# Patient Record
Sex: MALE | Race: WHITE | Employment: FULL TIME | URBAN - METROPOLITAN AREA
[De-identification: names, ages, dates, MRNs, and addresses within clinical notes are randomized per-mention and may not be internally consistent; named-entity substitution may affect disease eponyms.]

---

## 2018-07-13 PROBLEM — J43.8 OTHER EMPHYSEMA (HCC): Status: ACTIVE | Noted: 2018-07-13

## 2019-10-30 PROBLEM — I73.9 PVD (PERIPHERAL VASCULAR DISEASE) (HCC): Status: ACTIVE | Noted: 2019-10-30

## 2019-10-30 PROBLEM — F17.210 CIGARETTE SMOKER: Status: ACTIVE | Noted: 2019-10-30

## 2019-10-30 PROBLEM — Z91.89 MULTIPLE RISK FACTORS FOR CORONARY ARTERY DISEASE: Status: ACTIVE | Noted: 2019-10-30

## 2020-01-31 PROBLEM — N52.9 ERECTILE DYSFUNCTION: Status: ACTIVE | Noted: 2020-01-31

## 2020-01-31 PROBLEM — Z98.62 HISTORY OF ANGIOPLASTY: Status: ACTIVE | Noted: 2020-01-31

## 2021-10-31 ENCOUNTER — HOSPITAL ENCOUNTER (INPATIENT)
Age: 61
LOS: 3 days | Discharge: HOME HEALTH | DRG: 378 | End: 2021-11-03
Admitting: STUDENT IN AN ORGANIZED HEALTH CARE EDUCATION/TRAINING PROGRAM
Payer: COMMERCIAL

## 2021-10-31 ENCOUNTER — APPOINTMENT (OUTPATIENT)
Dept: CT IMAGING | Age: 61
DRG: 378 | End: 2021-10-31
Payer: COMMERCIAL

## 2021-10-31 ENCOUNTER — APPOINTMENT (OUTPATIENT)
Dept: ULTRASOUND IMAGING | Age: 61
DRG: 378 | End: 2021-10-31
Attending: STUDENT IN AN ORGANIZED HEALTH CARE EDUCATION/TRAINING PROGRAM
Payer: COMMERCIAL

## 2021-10-31 ENCOUNTER — APPOINTMENT (OUTPATIENT)
Dept: CT IMAGING | Age: 61
DRG: 378 | End: 2021-10-31
Attending: STUDENT IN AN ORGANIZED HEALTH CARE EDUCATION/TRAINING PROGRAM
Payer: COMMERCIAL

## 2021-10-31 DIAGNOSIS — I99.8 ISCHEMIC LEG PAIN: Primary | ICD-10-CM

## 2021-10-31 DIAGNOSIS — M79.606 ISCHEMIC LEG PAIN: Primary | ICD-10-CM

## 2021-10-31 DIAGNOSIS — K92.2 ACUTE GI BLEEDING: ICD-10-CM

## 2021-10-31 DIAGNOSIS — I70.209 ARTERIAL OCCLUSION, LOWER EXTREMITY (HCC): ICD-10-CM

## 2021-10-31 PROBLEM — N17.9 AKI (ACUTE KIDNEY INJURY) (HCC): Status: ACTIVE | Noted: 2021-10-31

## 2021-10-31 LAB
ABO + RH BLD: NORMAL
ALBUMIN SERPL-MCNC: 2.6 G/DL (ref 3.5–5)
ALBUMIN/GLOB SERPL: 0.8 {RATIO} (ref 1.2–3.5)
ALP SERPL-CCNC: 79 U/L (ref 50–136)
ALT SERPL-CCNC: 14 U/L (ref 12–65)
AMMONIA PLAS-SCNC: 26 UMOL/L (ref 11–32)
AMMONIA PLAS-SCNC: 43 UMOL/L (ref 11–32)
ANION GAP SERPL CALC-SCNC: 12 MMOL/L (ref 7–16)
AST SERPL-CCNC: 9 U/L (ref 15–37)
ATRIAL RATE: 115 BPM
BASOPHILS # BLD: 0.1 K/UL (ref 0–0.2)
BASOPHILS NFR BLD: 1 % (ref 0–2)
BILIRUB SERPL-MCNC: 0.4 MG/DL (ref 0.2–1.1)
BLOOD GROUP ANTIBODIES SERPL: NORMAL
BUN SERPL-MCNC: 50 MG/DL (ref 6–23)
CALCIUM SERPL-MCNC: 8.6 MG/DL (ref 8.3–10.4)
CALCULATED P AXIS, ECG09: 82 DEGREES
CALCULATED R AXIS, ECG10: 80 DEGREES
CALCULATED T AXIS, ECG11: 74 DEGREES
CHLORIDE SERPL-SCNC: 107 MMOL/L (ref 98–107)
CO2 SERPL-SCNC: 20 MMOL/L (ref 21–32)
CREAT SERPL-MCNC: 1.42 MG/DL (ref 0.8–1.5)
DIAGNOSIS, 93000: NORMAL
DIFFERENTIAL METHOD BLD: ABNORMAL
EOSINOPHIL # BLD: 0.2 K/UL (ref 0–0.8)
EOSINOPHIL NFR BLD: 1 % (ref 0.5–7.8)
ERYTHROCYTE [DISTWIDTH] IN BLOOD BY AUTOMATED COUNT: 15.1 % (ref 11.9–14.6)
EST. AVERAGE GLUCOSE BLD GHB EST-MCNC: 111 MG/DL
GLOBULIN SER CALC-MCNC: 3.3 G/DL (ref 2.3–3.5)
GLUCOSE BLD STRIP.AUTO-MCNC: 149 MG/DL (ref 65–100)
GLUCOSE SERPL-MCNC: 222 MG/DL (ref 65–100)
HBA1C MFR BLD: 5.5 % (ref 4.2–6.3)
HCT VFR BLD AUTO: 42.6 % (ref 41.1–50.3)
HGB BLD-MCNC: 11.3 G/DL (ref 13.6–17.2)
HGB BLD-MCNC: 12.1 G/DL (ref 13.6–17.2)
HGB BLD-MCNC: 13.6 G/DL (ref 13.6–17.2)
IMM GRANULOCYTES # BLD AUTO: 0.2 K/UL (ref 0–0.5)
IMM GRANULOCYTES NFR BLD AUTO: 2 % (ref 0–5)
INR PPP: 1
LYMPHOCYTES # BLD: 3.2 K/UL (ref 0.5–4.6)
LYMPHOCYTES NFR BLD: 27 % (ref 13–44)
MCH RBC QN AUTO: 30.8 PG (ref 26.1–32.9)
MCHC RBC AUTO-ENTMCNC: 31.9 G/DL (ref 31.4–35)
MCV RBC AUTO: 96.4 FL (ref 79.6–97.8)
MONOCYTES # BLD: 1.2 K/UL (ref 0.1–1.3)
MONOCYTES NFR BLD: 10 % (ref 4–12)
NEUTS SEG # BLD: 7.1 K/UL (ref 1.7–8.2)
NEUTS SEG NFR BLD: 59 % (ref 43–78)
NRBC # BLD: 0 K/UL (ref 0–0.2)
P-R INTERVAL, ECG05: 120 MS
PLATELET # BLD AUTO: 255 K/UL (ref 150–450)
PMV BLD AUTO: 10.1 FL (ref 9.4–12.3)
POTASSIUM SERPL-SCNC: 3.4 MMOL/L (ref 3.5–5.1)
PROT SERPL-MCNC: 5.9 G/DL (ref 6.3–8.2)
PROTHROMBIN TIME: 13.5 SEC (ref 12.6–14.5)
Q-T INTERVAL, ECG07: 340 MS
QRS DURATION, ECG06: 86 MS
QTC CALCULATION (BEZET), ECG08: 470 MS
RBC # BLD AUTO: 4.42 M/UL (ref 4.23–5.6)
SERVICE CMNT-IMP: ABNORMAL
SODIUM SERPL-SCNC: 139 MMOL/L (ref 138–145)
SPECIMEN EXP DATE BLD: NORMAL
VENTRICULAR RATE, ECG03: 115 BPM
WBC # BLD AUTO: 11.9 K/UL (ref 4.3–11.1)

## 2021-10-31 PROCEDURE — 94762 N-INVAS EAR/PLS OXIMTRY CONT: CPT

## 2021-10-31 PROCEDURE — 74011250636 HC RX REV CODE- 250/636: Performed by: STUDENT IN AN ORGANIZED HEALTH CARE EDUCATION/TRAINING PROGRAM

## 2021-10-31 PROCEDURE — 93005 ELECTROCARDIOGRAM TRACING: CPT

## 2021-10-31 PROCEDURE — 83036 HEMOGLOBIN GLYCOSYLATED A1C: CPT

## 2021-10-31 PROCEDURE — 74011250636 HC RX REV CODE- 250/636

## 2021-10-31 PROCEDURE — 96372 THER/PROPH/DIAG INJ SC/IM: CPT

## 2021-10-31 PROCEDURE — 85018 HEMOGLOBIN: CPT

## 2021-10-31 PROCEDURE — 80053 COMPREHEN METABOLIC PANEL: CPT

## 2021-10-31 PROCEDURE — 70450 CT HEAD/BRAIN W/O DYE: CPT

## 2021-10-31 PROCEDURE — 82140 ASSAY OF AMMONIA: CPT

## 2021-10-31 PROCEDURE — 74011000636 HC RX REV CODE- 636

## 2021-10-31 PROCEDURE — 74011000258 HC RX REV CODE- 258

## 2021-10-31 PROCEDURE — 96375 TX/PRO/DX INJ NEW DRUG ADDON: CPT

## 2021-10-31 PROCEDURE — 36415 COLL VENOUS BLD VENIPUNCTURE: CPT

## 2021-10-31 PROCEDURE — 85025 COMPLETE CBC W/AUTO DIFF WBC: CPT

## 2021-10-31 PROCEDURE — 82962 GLUCOSE BLOOD TEST: CPT

## 2021-10-31 PROCEDURE — 99285 EMERGENCY DEPT VISIT HI MDM: CPT

## 2021-10-31 PROCEDURE — 65660000000 HC RM CCU STEPDOWN

## 2021-10-31 PROCEDURE — C9113 INJ PANTOPRAZOLE SODIUM, VIA: HCPCS

## 2021-10-31 PROCEDURE — 86901 BLOOD TYPING SEROLOGIC RH(D): CPT

## 2021-10-31 PROCEDURE — 85610 PROTHROMBIN TIME: CPT

## 2021-10-31 PROCEDURE — 74011250637 HC RX REV CODE- 250/637: Performed by: STUDENT IN AN ORGANIZED HEALTH CARE EDUCATION/TRAINING PROGRAM

## 2021-10-31 PROCEDURE — 2709999900 HC NON-CHARGEABLE SUPPLY

## 2021-10-31 PROCEDURE — 96374 THER/PROPH/DIAG INJ IV PUSH: CPT

## 2021-10-31 PROCEDURE — 74011000250 HC RX REV CODE- 250

## 2021-10-31 PROCEDURE — 74011250636 HC RX REV CODE- 250/636: Performed by: HOSPITALIST

## 2021-10-31 PROCEDURE — 75635 CT ANGIO ABDOMINAL ARTERIES: CPT

## 2021-10-31 PROCEDURE — 74011000250 HC RX REV CODE- 250: Performed by: STUDENT IN AN ORGANIZED HEALTH CARE EDUCATION/TRAINING PROGRAM

## 2021-10-31 PROCEDURE — 74011250636 HC RX REV CODE- 250/636: Performed by: EMERGENCY MEDICINE

## 2021-10-31 PROCEDURE — 76705 ECHO EXAM OF ABDOMEN: CPT

## 2021-10-31 RX ORDER — ONDANSETRON 2 MG/ML
4 INJECTION INTRAMUSCULAR; INTRAVENOUS
Status: DISCONTINUED | OUTPATIENT
Start: 2021-10-31 | End: 2021-11-03 | Stop reason: HOSPADM

## 2021-10-31 RX ORDER — DEXTROSE 40 %
15 GEL (GRAM) ORAL AS NEEDED
Status: DISCONTINUED | OUTPATIENT
Start: 2021-10-31 | End: 2021-11-03 | Stop reason: HOSPADM

## 2021-10-31 RX ORDER — ONDANSETRON 2 MG/ML
4 INJECTION INTRAMUSCULAR; INTRAVENOUS
Status: DISCONTINUED | OUTPATIENT
Start: 2021-10-31 | End: 2021-10-31

## 2021-10-31 RX ORDER — LORAZEPAM 2 MG/ML
1 INJECTION INTRAMUSCULAR
Status: COMPLETED | OUTPATIENT
Start: 2021-10-31 | End: 2021-10-31

## 2021-10-31 RX ORDER — DEXTROSE 50 % IN WATER (D50W) INTRAVENOUS SYRINGE
25-50 AS NEEDED
Status: DISCONTINUED | OUTPATIENT
Start: 2021-10-31 | End: 2021-11-03 | Stop reason: HOSPADM

## 2021-10-31 RX ORDER — SODIUM CHLORIDE 0.9 % (FLUSH) 0.9 %
5-10 SYRINGE (ML) INJECTION EVERY 8 HOURS
Status: DISCONTINUED | OUTPATIENT
Start: 2021-10-31 | End: 2021-11-01

## 2021-10-31 RX ORDER — HALOPERIDOL 5 MG/ML
10 INJECTION INTRAMUSCULAR
Status: COMPLETED | OUTPATIENT
Start: 2021-10-31 | End: 2021-10-31

## 2021-10-31 RX ORDER — DIPHENHYDRAMINE HYDROCHLORIDE 50 MG/ML
25 INJECTION, SOLUTION INTRAMUSCULAR; INTRAVENOUS
Status: COMPLETED | OUTPATIENT
Start: 2021-10-31 | End: 2021-10-31

## 2021-10-31 RX ORDER — FENTANYL CITRATE 50 UG/ML
50 INJECTION, SOLUTION INTRAMUSCULAR; INTRAVENOUS ONCE
Status: COMPLETED | OUTPATIENT
Start: 2021-10-31 | End: 2021-10-31

## 2021-10-31 RX ORDER — ONDANSETRON 2 MG/ML
4 INJECTION INTRAMUSCULAR; INTRAVENOUS
Status: COMPLETED | OUTPATIENT
Start: 2021-10-31 | End: 2021-10-31

## 2021-10-31 RX ORDER — SODIUM CHLORIDE 9 MG/ML
1000 INJECTION, SOLUTION INTRAVENOUS CONTINUOUS
Status: DISCONTINUED | OUTPATIENT
Start: 2021-10-31 | End: 2021-11-03 | Stop reason: HOSPADM

## 2021-10-31 RX ORDER — LORAZEPAM 0.5 MG/1
0.5 TABLET ORAL
Status: DISCONTINUED | OUTPATIENT
Start: 2021-10-31 | End: 2021-11-03 | Stop reason: HOSPADM

## 2021-10-31 RX ORDER — LORAZEPAM 2 MG/ML
4 INJECTION INTRAMUSCULAR ONCE
Status: COMPLETED | OUTPATIENT
Start: 2021-10-31 | End: 2021-10-31

## 2021-10-31 RX ORDER — ATORVASTATIN CALCIUM 40 MG/1
40 TABLET, FILM COATED ORAL DAILY
Status: DISCONTINUED | OUTPATIENT
Start: 2021-10-31 | End: 2021-11-03 | Stop reason: HOSPADM

## 2021-10-31 RX ORDER — HYDROMORPHONE HYDROCHLORIDE 1 MG/ML
1 INJECTION, SOLUTION INTRAMUSCULAR; INTRAVENOUS; SUBCUTANEOUS ONCE
Status: DISCONTINUED | OUTPATIENT
Start: 2021-10-31 | End: 2021-10-31

## 2021-10-31 RX ORDER — SODIUM CHLORIDE 0.9 % (FLUSH) 0.9 %
10 SYRINGE (ML) INJECTION
Status: ACTIVE | OUTPATIENT
Start: 2021-10-31 | End: 2021-10-31

## 2021-10-31 RX ORDER — FOLIC ACID 1 MG/1
1 TABLET ORAL DAILY
Status: DISCONTINUED | OUTPATIENT
Start: 2021-11-03 | End: 2021-11-03 | Stop reason: HOSPADM

## 2021-10-31 RX ORDER — LORAZEPAM 0.5 MG/1
2 TABLET ORAL
Status: DISCONTINUED | OUTPATIENT
Start: 2021-10-31 | End: 2021-11-03 | Stop reason: HOSPADM

## 2021-10-31 RX ORDER — LORAZEPAM 0.5 MG/1
1 TABLET ORAL
Status: DISCONTINUED | OUTPATIENT
Start: 2021-10-31 | End: 2021-11-03 | Stop reason: HOSPADM

## 2021-10-31 RX ORDER — INSULIN LISPRO 100 [IU]/ML
INJECTION, SOLUTION INTRAVENOUS; SUBCUTANEOUS
Status: DISCONTINUED | OUTPATIENT
Start: 2021-10-31 | End: 2021-11-03 | Stop reason: HOSPADM

## 2021-10-31 RX ORDER — SODIUM CHLORIDE 0.9 % (FLUSH) 0.9 %
5-40 SYRINGE (ML) INJECTION AS NEEDED
Status: DISCONTINUED | OUTPATIENT
Start: 2021-10-31 | End: 2021-11-03 | Stop reason: HOSPADM

## 2021-10-31 RX ORDER — SODIUM CHLORIDE 0.9 % (FLUSH) 0.9 %
5-40 SYRINGE (ML) INJECTION EVERY 8 HOURS
Status: DISCONTINUED | OUTPATIENT
Start: 2021-10-31 | End: 2021-11-03 | Stop reason: HOSPADM

## 2021-10-31 RX ORDER — SODIUM CHLORIDE 0.9 % (FLUSH) 0.9 %
5-10 SYRINGE (ML) INJECTION AS NEEDED
Status: DISCONTINUED | OUTPATIENT
Start: 2021-10-31 | End: 2021-11-01

## 2021-10-31 RX ORDER — HALOPERIDOL 5 MG/ML
4 INJECTION INTRAMUSCULAR ONCE
Status: COMPLETED | OUTPATIENT
Start: 2021-10-31 | End: 2021-10-31

## 2021-10-31 RX ORDER — FENTANYL CITRATE 50 UG/ML
INJECTION, SOLUTION INTRAMUSCULAR; INTRAVENOUS
Status: COMPLETED
Start: 2021-10-31 | End: 2021-10-31

## 2021-10-31 RX ORDER — LORAZEPAM 2 MG/ML
2 INJECTION INTRAMUSCULAR
Status: DISCONTINUED | OUTPATIENT
Start: 2021-10-31 | End: 2021-11-03 | Stop reason: HOSPADM

## 2021-10-31 RX ORDER — ATENOLOL 25 MG/1
25 TABLET ORAL DAILY PRN
Status: DISCONTINUED | OUTPATIENT
Start: 2021-10-31 | End: 2021-11-03 | Stop reason: HOSPADM

## 2021-10-31 RX ORDER — LANOLIN ALCOHOL/MO/W.PET/CERES
100 CREAM (GRAM) TOPICAL DAILY
Status: DISCONTINUED | OUTPATIENT
Start: 2021-11-03 | End: 2021-11-03 | Stop reason: HOSPADM

## 2021-10-31 RX ORDER — HYDROMORPHONE HYDROCHLORIDE 1 MG/ML
1 INJECTION, SOLUTION INTRAMUSCULAR; INTRAVENOUS; SUBCUTANEOUS ONCE
Status: COMPLETED | OUTPATIENT
Start: 2021-10-31 | End: 2021-10-31

## 2021-10-31 RX ORDER — MORPHINE SULFATE 2 MG/ML
2 INJECTION, SOLUTION INTRAMUSCULAR; INTRAVENOUS
Status: DISCONTINUED | OUTPATIENT
Start: 2021-10-31 | End: 2021-11-03 | Stop reason: HOSPADM

## 2021-10-31 RX ADMIN — Medication 5 ML: at 20:20

## 2021-10-31 RX ADMIN — HYDROMORPHONE HYDROCHLORIDE 1 MG: 1 INJECTION, SOLUTION INTRAMUSCULAR; INTRAVENOUS; SUBCUTANEOUS at 07:34

## 2021-10-31 RX ADMIN — FOLIC ACID: 5 INJECTION, SOLUTION INTRAMUSCULAR; INTRAVENOUS; SUBCUTANEOUS at 17:18

## 2021-10-31 RX ADMIN — ATORVASTATIN CALCIUM 40 MG: 40 TABLET, FILM COATED ORAL at 12:56

## 2021-10-31 RX ADMIN — LORAZEPAM 0.5 MG: 0.5 TABLET ORAL at 13:38

## 2021-10-31 RX ADMIN — SODIUM CHLORIDE 1000 ML: 900 INJECTION, SOLUTION INTRAVENOUS at 06:43

## 2021-10-31 RX ADMIN — FENTANYL CITRATE 50 MCG: 50 INJECTION INTRAMUSCULAR; INTRAVENOUS at 06:38

## 2021-10-31 RX ADMIN — LORAZEPAM 1 MG: 2 INJECTION INTRAMUSCULAR; INTRAVENOUS at 07:05

## 2021-10-31 RX ADMIN — LORAZEPAM 4 MG: 2 INJECTION INTRAMUSCULAR; INTRAVENOUS at 22:40

## 2021-10-31 RX ADMIN — Medication 5 ML: at 13:08

## 2021-10-31 RX ADMIN — ONDANSETRON 4 MG: 2 INJECTION INTRAMUSCULAR; INTRAVENOUS at 07:35

## 2021-10-31 RX ADMIN — Medication 5 ML: at 20:21

## 2021-10-31 RX ADMIN — ONDANSETRON 4 MG: 2 INJECTION INTRAMUSCULAR; INTRAVENOUS at 18:14

## 2021-10-31 RX ADMIN — LORAZEPAM 1 MG: 0.5 TABLET ORAL at 17:36

## 2021-10-31 RX ADMIN — LORAZEPAM 2 MG: 2 INJECTION INTRAMUSCULAR; INTRAVENOUS at 20:14

## 2021-10-31 RX ADMIN — SODIUM CHLORIDE 40 MG: 9 INJECTION INTRAMUSCULAR; INTRAVENOUS; SUBCUTANEOUS at 06:51

## 2021-10-31 RX ADMIN — MORPHINE SULFATE 2 MG: 2 INJECTION, SOLUTION INTRAMUSCULAR; INTRAVENOUS at 18:13

## 2021-10-31 RX ADMIN — DIPHENHYDRAMINE HYDROCHLORIDE 25 MG: 50 INJECTION, SOLUTION INTRAMUSCULAR; INTRAVENOUS at 06:50

## 2021-10-31 RX ADMIN — HALOPERIDOL LACTATE 4 MG: 5 INJECTION, SOLUTION INTRAMUSCULAR at 20:59

## 2021-10-31 RX ADMIN — FENTANYL CITRATE 50 MCG: 50 INJECTION INTRAMUSCULAR; INTRAVENOUS at 06:37

## 2021-10-31 RX ADMIN — HALOPERIDOL LACTATE 10 MG: 5 INJECTION, SOLUTION INTRAMUSCULAR at 06:50

## 2021-10-31 RX ADMIN — FENTANYL CITRATE 50 MCG: 50 INJECTION, SOLUTION INTRAMUSCULAR; INTRAVENOUS at 06:37

## 2021-10-31 RX ADMIN — SODIUM CHLORIDE 1000 ML: 900 INJECTION, SOLUTION INTRAVENOUS at 12:55

## 2021-10-31 RX ADMIN — SODIUM CHLORIDE 100 ML: 900 INJECTION, SOLUTION INTRAVENOUS at 07:38

## 2021-10-31 RX ADMIN — IOPAMIDOL 100 ML: 755 INJECTION, SOLUTION INTRAVENOUS at 07:32

## 2021-10-31 RX ADMIN — SODIUM CHLORIDE 40 MG: 9 INJECTION INTRAMUSCULAR; INTRAVENOUS; SUBCUTANEOUS at 20:18

## 2021-10-31 NOTE — CONSULTS
History and Physical/Surgical Consult   Leo Keller    Admit date: 10/31/2021    MRN: [id number]     : 1960     Age: 62 y.o.          10/31/2021 10:08 AM    Subjective/HPI:     27-year-old male brought in by EMS. They were called to the scene who is home for bloody emesis and right leg pain. Patient said alcohol of the drinks daily. Has not been taking his medications. His right foot is cold and pulseless. Patient is having terrible pain in the right foot as well. Patient describes black tarry stool for several days. Patient had a blood clot in his right leg in  did not require a stent. Review of Systems  Pertinent items are noted in HPI. Past Medical History:   Diagnosis Date    CAD (coronary artery disease)     Hypertension     Hypertensive cardiomegaly 10/4/2016    Hypertensive heart disease, unspecified without heart failure 10/4/2016    Incomplete bladder emptying 10/4/2016    Thromboembolus ([address]       Past Surgical History:   Procedure Laterality Date    HX OTHER SURGICAL      DVT    CT LEFT HEART CATH,PERCUTANEOUS  2012    Xience to RCA      No Known Allergies   Social History     Tobacco Use    Smoking status: Current Every Day Smoker     Packs/day: 1.00     Years: 20.00     Pack years: 20.00    Smokeless tobacco: Never Used   Substance Use Topics    Alcohol use: Yes     Alcohol/week: 5.8 standard drinks     Types: 7 Cans of beer per week      Social History     Social History Narrative    Not on file     Family History   Problem Relation Age of Onset    Heart Disease Father     Cancer Father     Heart Disease Mother     Cancer Sister     Diabetes Brother     Diabetes Maternal Grandmother     Cancer Maternal Grandmother       Prior to Admission Medications   Prescriptions Last Dose Informant Patient Reported? Taking?   aspirin (ASPIRIN) 325 mg tablet   No No   Sig: Take 1 Tab by mouth daily.    atorvastatin (LIPITOR) 40 mg tablet   No No   Sig: Take 1 Tab by mouth daily. carvedilol (COREG) 12.5 mg tablet   No No   Sig: Take 1 Tab by mouth two (2) times a day. lisinopril (PRINIVIL, ZESTRIL) 20 mg tablet   No No   Sig: Take 1 Tab by mouth daily. sildenafil, antihypertensive, (REVATIO) 20 mg tablet   No No   Si daily as needed for erectile dysfunction      Facility-Administered Medications: None     Current Facility-Administered Medications   Medication Dose Route Frequency    sodium chloride (NS) flush 5-10 mL  5-10 mL IntraVENous Q8H    sodium chloride (NS) flush 5-10 mL  5-10 mL IntraVENous PRN    saline peripheral flush soln 10 mL  10 mL InterCATHeter RAD ONCE    pantoprazole (PROTONIX) 40 mg in 0.9% sodium chloride 10 mL injection  40 mg IntraVENous Q12H    0.9% sodium chloride infusion 1,000 mL  1,000 mL IntraVENous CONTINUOUS    sodium chloride (NS) flush 5-40 mL  5-40 mL IntraVENous Q8H    sodium chloride (NS) flush 5-40 mL  5-40 mL IntraVENous PRN    ondansetron (ZOFRAN) injection 4 mg  4 mg IntraVENous Q4H PRN    LORazepam (ATIVAN) tablet 0.5 mg  0.5 mg Oral Q6H PRN    atorvastatin (LIPITOR) tablet 40 mg  40 mg Oral DAILY    morphine injection 2 mg  2 mg IntraVENous Q4H PRN     Current Outpatient Medications   Medication Sig    aspirin (ASPIRIN) 325 mg tablet Take 1 Tab by mouth daily.  atorvastatin (LIPITOR) 40 mg tablet Take 1 Tab by mouth daily.  carvedilol (COREG) 12.5 mg tablet Take 1 Tab by mouth two (2) times a day.  lisinopril (PRINIVIL, ZESTRIL) 20 mg tablet Take 1 Tab by mouth daily.  sildenafil, antihypertensive, (REVATIO) 20 mg tablet 225 daily as needed for erectile dysfunction     Objective:     Vitals:    10/31/21 0737 10/31/21 0738 10/31/21 0751 10/31/21 0842   BP:  (!) 151/81 136/65    Pulse: (!) 104 (!) 105 (!) 112    Resp: 26 27 25    SpO2: 99% 93%  98%   Weight:       Height:         No intake/output data recorded. No intake/output data recorded. Physical Exam:   Vitals and nursing note reviewed. Constitutional:       General: He is in acute distress. He is intoxicated still and not making much sense     Appearance: He is well-developed. He is ill-appearing and toxic-appearing. HENT:      Head: Normocephalic and atraumatic. Right Ear: External ear normal.      Left Ear: External ear normal.      Nose: Nose normal.   Eyes:      General: No scleral icterus. Right eye: No discharge. Left eye: No discharge. Conjunctiva/sclera: Conjunctivae normal.      Pupils: Pupils are equal, round, and reactive to light. Cardiovascular:      Rate and Rhythm: Regular rhythm. Pulmonary:      Effort: Pulmonary effort is normal. No respiratory distress. Breath sounds: Normal breath sounds. No stridor. No wheezing or rales. Abdominal:      General: Bowel sounds are normal. There is no distension. Palpations: Abdomen is soft. Tenderness: There is no abdominal tenderness. Genitourinary:     Rectum: Guaiac result positive. Musculoskeletal:         General: Normal range of motion. Cervical back: Normal range of motion. Right foot: Decreased capillary refill. Abnormal pulse. Legs:Data Review   Recent Labs     10/31/21  0631   WBC 11.9*   HGB 13.6   HCT 42.6        Recent Labs     10/31/21  0631      K 3.4*      CO2 20*   *   BUN 50*   CREA 1.42   INR 1.0       Assessment:     Hospital Problems  Date Reviewed: 4/12/2019        Codes Class Noted POA    GIB (gastrointestinal bleeding) ICD-10-CM: K92.2  ICD-9-CM: 578.9  10/31/2021 Unknown        Ischemic leg pain ICD-10-CM: M79.606, I99.8  ICD-9-CM: 459.9, 729.5  10/31/2021 Unknown            Plan:     --    Thank you very much for this referral.  The family had left at this point. I have discussed the patient with the ER staff. I had reviewed the CTA which was of suboptimal quality. It is probably due to moving artifact.   He has multilevel occlusions and evidence of chronic vascular disease. Due to the amount of bleeding that is ongoing the GI this would take precedence over the cold like. He would require thrombolysis with TPA which is contraindicated here and at a minimum any intervention will require loading with heparin which would also make the bleeding more complicated. It is a difficult predicament which most likely going to end up with him losing his right leg. Approximately 45 minutes  was spent in direct patient contact during this encounter including 50% of which was spend in patient education, counseling, review of medical history and imaging, and medical decision making.

## 2021-10-31 NOTE — CONSULTS
[facility] Consult Note    Consult Date: 10/31/2021    Chief Complaint: GI bleed    Subjective:     History of Present Illness:  Patient is a 62 y.o. who is seen in consultation at the request of Dr. Timoteo Oconnor for GI bleed. Patient very agitated from R leg pain and did not focus on my questions. But from what I was able to get, he vomited red blood x 1 today, three melenic BM. Never has had GI bleed before. Our office does not have any records on him. He takes Aleve (most recently a few days ago). No CP, SOB, abd pains. He has severe constant R leg pain worsening over the past few months. According to Dr Segura Foot note, patient had R leg blood clot, no stent. PMH:  Past Medical History:   Diagnosis Date    CAD (coronary artery disease)     Hypertension     Hypertensive cardiomegaly 10/4/2016    Hypertensive heart disease, unspecified without heart failure 10/4/2016    Incomplete bladder emptying 10/4/2016    Thromboembolus ([address]    June 2012: cardiac cath single RCA stenosis stented with BASIL. Chronic PVD    PSH:  Past Surgical History:   Procedure Laterality Date    HX OTHER SURGICAL      DVT    [place] LEFT HEART CATH,PERCUTANEOUS  6/14/2012    Xience to RCA       Allergies:  No Known Allergies    Home Medications:  Prior to Admission medications    Medication Sig Start Date End Date Taking? Authorizing Provider   aspirin (ASPIRIN) 325 mg tablet Take 1 Tab by mouth daily. 10/30/19   Bethel Corona MD   atorvastatin (LIPITOR) 40 mg tablet Take 1 Tab by mouth daily. 10/30/19   Bethel Corona MD   carvedilol (COREG) 12.5 mg tablet Take 1 Tab by mouth two (2) times a day. 10/30/19   Bethel Corona MD   lisinopril (PRINIVIL, ZESTRIL) 20 mg tablet Take 1 Tab by mouth daily.  10/30/19   Farida Corona MD   sildenafil, antihypertensive, (REVATIO) 20 mg tablet 225 daily as needed for erectile dysfunction 7/13/18   Brigitte Best MD       [facility] Medications:  Current Facility-Administered Medications   Medication Dose Route Frequency    sodium chloride (NS) flush 5-10 mL  5-10 mL IntraVENous Q8H    sodium chloride (NS) flush 5-10 mL  5-10 mL IntraVENous PRN    saline peripheral flush soln 10 mL  10 mL InterCATHeter RAD ONCE    pantoprazole (PROTONIX) 40 mg in 0.9% sodium chloride 10 mL injection  40 mg IntraVENous Q12H    HYDROmorphone (DILAUDID) injection 1 mg  1 mg IntraVENous ONCE    ondansetron (ZOFRAN) injection 4 mg  4 mg IntraVENous NOW     Current Outpatient Medications   Medication Sig    aspirin (ASPIRIN) 325 mg tablet Take 1 Tab by mouth daily.  atorvastatin (LIPITOR) 40 mg tablet Take 1 Tab by mouth daily.  carvedilol (COREG) 12.5 mg tablet Take 1 Tab by mouth two (2) times a day.  lisinopril (PRINIVIL, ZESTRIL) 20 mg tablet Take 1 Tab by mouth daily.  sildenafil, antihypertensive, (REVATIO) 20 mg tablet 225 daily as needed for erectile dysfunction       Social History:  Social History     Tobacco Use    Smoking status: Current Every Day Smoker     Packs/day: 1.00     Years: 20.00     Pack years: 20.00    Smokeless tobacco: Never Used   Substance Use Topics    Alcohol use: Yes     Alcohol/week: 5.8 standard drinks     Types: 7 Cans of beer per week         Family History:  Family History   Problem Relation Age of Onset    Heart Disease Father     Cancer Father     Heart Disease Mother     Cancer Sister     Diabetes Brother     Diabetes Maternal Grandmother     Cancer Maternal Grandmother        Review of Systems:  A 10 system ROS is obtained, with pertinent positives as listed in the HPI and PMH, otherwise, all others are negative. Objective:     Physical Exam:  Vitals:  Visit Vitals  BP (!) 145/81   Pulse (!) 108   Resp 17   Ht 6' (1.829 m)   Wt 72.6 kg (160 lb)   SpO2 94%   BMI 21.70 kg/m²       Skin:  Extremities and face reveal no rashes. No haq erythema. No telangiectasias on the chest wall. HEENT: Sclerae anicteric. Extra-occular muscles are intact. No oral ulcers. No abnormal pigmentation of the lips. The neck is supple. Cardiovascular: Regular rate and rhythm. No murmurs, gallops, or rubs. Respiratory:  Comfortable breathing  With no accessory muscle use. Clear breath sounds with no wheezes, rales, or rhonchi. GI:  Abdomen nondistended, soft, and nontender. Normal active bowel sounds. No enlargement of the liver or spleen. No masses palpable. Rectal:  Deferred  Musculoskeletal:  Pale feet bilaterally, no distal pulses, cool. Neurological:  Gross memory appears intact. Patient is alert and oriented. Psychiatric:  Very agitated, moving around the ED stretcher bed, stating he has to get his leg lower. Lymphatic:  No cervical or supraclavicular adenopathy. Laboratory:    Recent Results (from the past 24 hour(s))   EKG, 12 LEAD, INITIAL    Collection Time: 10/31/21  6:12 AM   Result Value Ref Range    Ventricular Rate 115 BPM    Atrial Rate 115 BPM    P-R Interval 120 ms    QRS Duration 86 ms    Q-T Interval 340 ms    QTC Calculation (Bezet) 470 ms    Calculated P Axis 82 degrees    Calculated R Axis 80 degrees    Calculated T Axis 74 degrees    Diagnosis         Sinus tachycardia with Possible Premature atrial complexes with Aberrant   conduction  Right atrial enlargement  Nonspecific ST abnormality  Abnormal ECG  When compared with ECG of 14-JUN-2012 10:25,  Vent.  rate has increased BY  65 BPM  ST now depressed in Inferior leads  ST now depressed in Lateral leads  Confirmed by Renato Peterson ([id number]) on 10/31/2021 7:50:45 AM     CBC WITH AUTOMATED DIFF    Collection Time: 10/31/21  6:31 AM   Result Value Ref Range    WBC 11.9 (H) 4.3 - 11.1 K/uL    RBC 4.42 4.23 - 5.6 M/uL    HGB 13.6 13.6 - 17.2 g/dL    HCT 42.6 41.1 - 50.3 %    MCV 96.4 79.6 - 97.8 FL    MCH 30.8 26.1 - 32.9 PG    MCHC 31.9 31.4 - 35.0 g/dL    RDW 15.1 (H) 11.9 - 14.6 %    PLATELET 503 935 - 083 K/uL    MPV 10.1 9.4 - 12.3 FL    ABSOLUTE NRBC 0. 00 0.0 - 0.2 K/uL    DF AUTOMATED      NEUTROPHILS 59 43 - 78 %    LYMPHOCYTES 27 13 - 44 %    MONOCYTES 10 4.0 - 12.0 %    EOSINOPHILS 1 0.5 - 7.8 %    BASOPHILS 1 0.0 - 2.0 %    IMMATURE GRANULOCYTES 2 0.0 - 5.0 %    ABS. NEUTROPHILS 7.1 1.7 - 8.2 K/UL    ABS. LYMPHOCYTES 3.2 0.5 - 4.6 K/UL    ABS. MONOCYTES 1.2 0.1 - 1.3 K/UL    ABS. EOSINOPHILS 0.2 0.0 - 0.8 K/UL    ABS. BASOPHILS 0.1 0.0 - 0.2 K/UL    ABS. IMM. GRANS. 0.2 0.0 - 0.5 K/UL   METABOLIC PANEL, COMPREHENSIVE    Collection Time: 10/31/21  6:31 AM   Result Value Ref Range    Sodium 139 138 - 145 mmol/L    Potassium 3.4 (L) 3.5 - 5.1 mmol/L    Chloride 107 98 - 107 mmol/L    CO2 20 (L) 21 - 32 mmol/L    Anion gap 12 7 - 16 mmol/L    Glucose 222 (H) 65 - 100 mg/dL    BUN 50 (H) 6 - 23 MG/DL    Creatinine 1.42 0.8 - 1.5 MG/DL    GFR est AA >60 >60 ml/min/1.73m2    GFR est non-AA 54 (L) >60 ml/min/1.73m2    Calcium 8.6 8.3 - 10.4 MG/DL    Bilirubin, total 0.4 0.2 - 1.1 MG/DL    ALT (SGPT) 14 12 - 65 U/L    AST (SGOT) 9 (L) 15 - 37 U/L    Alk. phosphatase 79 50 - 136 U/L    Protein, total 5.9 (L) 6.3 - 8.2 g/dL    Albumin 2.6 (L) 3.5 - 5.0 g/dL    Globulin 3.3 2.3 - 3.5 g/dL    A-G Ratio 0.8 (L) 1.2 - 3.5     PROTHROMBIN TIME + INR    Collection Time: 10/31/21  6:31 AM   Result Value Ref Range    Prothrombin time 13.5 12.6 - 14.5 sec    INR 1.0         Assessment:       Active Problems:    * No active hospital problems. *      Plan:       Hematemesis, melena. Aleve use, tob use risk factors for PUD. ETOH risk factor for cirrhosis and possible portal HTN or varices. Monitor in hospital.  Consider EGD tomorrow. PPI for now. Alcoholism. Risk for DTs    PVD with acute leg symptoms.   Per Vascular or Hospitalist.

## 2021-10-31 NOTE — PROGRESS NOTES
Problem: Pressure Injury - Risk of  Goal: *Prevention of pressure injury  Description: Document Jose Scale and appropriate interventions in the flowsheet.   Outcome: Progressing Towards Goal  Note: Pressure Injury Interventions:       Moisture Interventions: Limit adult briefs    Activity Interventions: PT/OT evaluation    Mobility Interventions: PT/OT evaluation

## 2021-10-31 NOTE — PROGRESS NOTES
10/31/21 1201   Dual Skin Pressure Injury Assessment   Dual Skin Pressure Injury Assessment X   Primary Nurse Yue Garcia, RN and  Portia Oneil  RN, RN performed a dual skin assessment on this patient No impairment noted  Jose score is 20

## 2021-10-31 NOTE — ED TRIAGE NOTES
Pt comes with [facility] from home c/o vomiting blood, R leg pain. Stated he had red vomit today. Pt has a hx of daily alcohol consumption for \" years\". Stated his last drink was this morning. PT denied abd pain, diarrhea or any other complaints. Pt has R leg pain both bilateral cold lower extremities and no palpable pulses on both feet. Unable to get pulses even with a doppler. When asked about his legs he stated this has been going on \" for quite sometime \" but is unable to provide specifics.

## 2021-10-31 NOTE — H&P (VIEW-ONLY)
[facility] Consult Note Consult Date: 10/31/2021 Chief Complaint: GI bleed Subjective:  
 
History of Present Illness:  Patient is a 62 y.o. who is seen in consultation at the request of Dr. Timoteo Oconnor for GI bleed. Patient very agitated from R leg pain and did not focus on my questions. But from what I was able to get, he vomited red blood x 1 today, three melenic BM. Never has had GI bleed before. Our office does not have any records on him. He takes Aleve (most recently a few days ago). No CP, SOB, abd pains. He has severe constant R leg pain worsening over the past few months. According to Dr Segura Foot note, patient had R leg blood clot, no stent. PMH: 
Past Medical History:  
Diagnosis Date  CAD (coronary artery disease)  Hypertension  Hypertensive cardiomegaly 10/4/2016  Hypertensive heart disease, unspecified without heart failure 10/4/2016  Incomplete bladder emptying 10/4/2016  Thromboembolus ([address] June 2012: cardiac cath single RCA stenosis stented with BASIL. Chronic PVD 
 
PSH: 
Past Surgical History:  
Procedure Laterality Date  HX OTHER SURGICAL    
 DVT  [place] LEFT HEART CATH,PERCUTANEOUS  6/14/2012 Xience to RCA Allergies: 
No Known Allergies Home Medications: 
Prior to Admission medications Medication Sig Start Date End Date Taking? Authorizing Provider  
aspirin (ASPIRIN) 325 mg tablet Take 1 Tab by mouth daily. 10/30/19   Bethel Corona MD  
atorvastatin (LIPITOR) 40 mg tablet Take 1 Tab by mouth daily. 10/30/19   Bethel Corona MD  
carvedilol (COREG) 12.5 mg tablet Take 1 Tab by mouth two (2) times a day. 10/30/19   Bethel Corona MD  
lisinopril (PRINIVIL, ZESTRIL) 20 mg tablet Take 1 Tab by mouth daily.  10/30/19   Farida Corona MD  
sildenafil, antihypertensive, (REVATIO) 20 mg tablet 225 daily as needed for erectile dysfunction 7/13/18   Brigitte Best MD  
 
 
[facility] Medications: 
Current Facility-Administered Medications Medication Dose Route Frequency  sodium chloride (NS) flush 5-10 mL  5-10 mL IntraVENous Q8H  
 sodium chloride (NS) flush 5-10 mL  5-10 mL IntraVENous PRN  
 saline peripheral flush soln 10 mL  10 mL InterCATHeter RAD ONCE  pantoprazole (PROTONIX) 40 mg in 0.9% sodium chloride 10 mL injection  40 mg IntraVENous Q12H  
 HYDROmorphone (DILAUDID) injection 1 mg  1 mg IntraVENous ONCE  
 ondansetron (ZOFRAN) injection 4 mg  4 mg IntraVENous NOW Current Outpatient Medications Medication Sig  
 aspirin (ASPIRIN) 325 mg tablet Take 1 Tab by mouth daily.  atorvastatin (LIPITOR) 40 mg tablet Take 1 Tab by mouth daily.  carvedilol (COREG) 12.5 mg tablet Take 1 Tab by mouth two (2) times a day.  lisinopril (PRINIVIL, ZESTRIL) 20 mg tablet Take 1 Tab by mouth daily.  sildenafil, antihypertensive, (REVATIO) 20 mg tablet 225 daily as needed for erectile dysfunction Social History: 
Social History Tobacco Use  Smoking status: Current Every Day Smoker Packs/day: 1.00 Years: 20.00 Pack years: 20.00  Smokeless tobacco: Never Used Substance Use Topics  Alcohol use: Yes Alcohol/week: 5.8 standard drinks Types: 7 Cans of beer per week Family History: 
Family History Problem Relation Age of Onset  Heart Disease Father  Cancer Father  Heart Disease Mother  Cancer Sister  Diabetes Brother  Diabetes Maternal Grandmother  Cancer Maternal Grandmother Review of Systems: A 10 system ROS is obtained, with pertinent positives as listed in the HPI and PMH, otherwise, all others are negative. Objective:  
 
Physical Exam: 
Vitals: 
Visit Vitals BP (!) 145/81 Pulse (!) 108 Resp 17 Ht 6' (1.829 m) Wt 72.6 kg (160 lb) SpO2 94% BMI 21.70 kg/m² Skin:  Extremities and face reveal no rashes. No haq erythema. No telangiectasias on the chest wall. HEENT: Sclerae anicteric. Extra-occular muscles are intact. No oral ulcers. No abnormal pigmentation of the lips. The neck is supple. Cardiovascular: Regular rate and rhythm. No murmurs, gallops, or rubs. Respiratory:  Comfortable breathing  With no accessory muscle use. Clear breath sounds with no wheezes, rales, or rhonchi. GI:  Abdomen nondistended, soft, and nontender. Normal active bowel sounds. No enlargement of the liver or spleen. No masses palpable. Rectal:  Deferred Musculoskeletal:  Pale feet bilaterally, no distal pulses, cool. Neurological:  Gross memory appears intact. Patient is alert and oriented. Psychiatric:  Very agitated, moving around the ED stretcher bed, stating he has to get his leg lower. Lymphatic:  No cervical or supraclavicular adenopathy. Laboratory:   
Recent Results (from the past 24 hour(s)) EKG, 12 LEAD, INITIAL Collection Time: 10/31/21  6:12 AM  
Result Value Ref Range Ventricular Rate 115 BPM  
 Atrial Rate 115 BPM  
 P-R Interval 120 ms QRS Duration 86 ms  
 Q-T Interval 340 ms QTC Calculation (Bezet) 470 ms Calculated P Axis 82 degrees Calculated R Axis 80 degrees Calculated T Axis 74 degrees Diagnosis Sinus tachycardia with Possible Premature atrial complexes with Aberrant  
conduction Right atrial enlargement Nonspecific ST abnormality Abnormal ECG When compared with ECG of 14-JUN-2012 10:25, 
Vent. rate has increased BY  65 BPM 
ST now depressed in Inferior leads ST now depressed in Lateral leads Confirmed by Renato Peterson ([id number]) on 10/31/2021 7:50:45 AM 
  
CBC WITH AUTOMATED DIFF Collection Time: 10/31/21  6:31 AM  
Result Value Ref Range WBC 11.9 (H) 4.3 - 11.1 K/uL  
 RBC 4.42 4.23 - 5.6 M/uL  
 HGB 13.6 13.6 - 17.2 g/dL HCT 42.6 41.1 - 50.3 % MCV 96.4 79.6 - 97.8 FL  
 MCH 30.8 26.1 - 32.9 PG  
 MCHC 31.9 31.4 - 35.0 g/dL  
 RDW 15.1 (H) 11.9 - 14.6 % PLATELET 255 839 - 744 K/uL MPV 10.1 9.4 - 12.3 FL  ABSOLUTE NRBC 0. 00 0.0 - 0.2 K/uL  
 DF AUTOMATED NEUTROPHILS 59 43 - 78 % LYMPHOCYTES 27 13 - 44 % MONOCYTES 10 4.0 - 12.0 % EOSINOPHILS 1 0.5 - 7.8 % BASOPHILS 1 0.0 - 2.0 % IMMATURE GRANULOCYTES 2 0.0 - 5.0 %  
 ABS. NEUTROPHILS 7.1 1.7 - 8.2 K/UL  
 ABS. LYMPHOCYTES 3.2 0.5 - 4.6 K/UL  
 ABS. MONOCYTES 1.2 0.1 - 1.3 K/UL  
 ABS. EOSINOPHILS 0.2 0.0 - 0.8 K/UL  
 ABS. BASOPHILS 0.1 0.0 - 0.2 K/UL  
 ABS. IMM. GRANS. 0.2 0.0 - 0.5 K/UL METABOLIC PANEL, COMPREHENSIVE Collection Time: 10/31/21  6:31 AM  
Result Value Ref Range Sodium 139 138 - 145 mmol/L Potassium 3.4 (L) 3.5 - 5.1 mmol/L Chloride 107 98 - 107 mmol/L  
 CO2 20 (L) 21 - 32 mmol/L Anion gap 12 7 - 16 mmol/L Glucose 222 (H) 65 - 100 mg/dL BUN 50 (H) 6 - 23 MG/DL Creatinine 1.42 0.8 - 1.5 MG/DL  
 GFR est AA >60 >60 ml/min/1.73m2 GFR est non-AA 54 (L) >60 ml/min/1.73m2 Calcium 8.6 8.3 - 10.4 MG/DL Bilirubin, total 0.4 0.2 - 1.1 MG/DL  
 ALT (SGPT) 14 12 - 65 U/L  
 AST (SGOT) 9 (L) 15 - 37 U/L Alk. phosphatase 79 50 - 136 U/L Protein, total 5.9 (L) 6.3 - 8.2 g/dL Albumin 2.6 (L) 3.5 - 5.0 g/dL Globulin 3.3 2.3 - 3.5 g/dL A-G Ratio 0.8 (L) 1.2 - 3.5 PROTHROMBIN TIME + INR Collection Time: 10/31/21  6:31 AM  
Result Value Ref Range Prothrombin time 13.5 12.6 - 14.5 sec INR 1.0 Assessment: 
  
 
Active Problems: * No active hospital problems. * 
 
 
Plan: 
  
 
Hematemesis, melena. Aleve use, tob use risk factors for PUD. ETOH risk factor for cirrhosis and possible portal HTN or varices. Monitor in hospital.  Consider EGD tomorrow. PPI for now. Alcoholism. Risk for DTs PVD with acute leg symptoms.   Per Vascular or Hospitalist.

## 2021-10-31 NOTE — ED NOTES
Pt arrived via EMS. Yelling in pain. complains of rt leg pain since \"a while\" awake alert ox4, pale . ST on monitor. mask on.pedal pulses and pop.pulses not palpable nor with doppler Dr Yadira Ulloa aware -Pt also complains of dark bloody stools and vomited red emesis.

## 2021-10-31 NOTE — PROGRESS NOTES
Reviewed notes for concerns      Per notes:        64-year-old male brought in by EMS. They were called to the scene who is home for bloody emesis and right leg pain. Patient said alcohol of the drinks daily. Has not been taking his medications. His right foot is cold and pulseless. Patient is having terrible pain in the right foot as well. Patient describes black tarry stool for several days. Patient had a blood clot in his right leg in 2005 did not require a stent.       Will continue to assess how to best serve this family    Patient is calm    Family is in the room    Staff in room providing compassionate care to all

## 2021-10-31 NOTE — PROGRESS NOTES
Restless  Mild agitation  Melena stools x1  Ativan for restlessness  Sister came to visit , took his wallet home from ER  Off to [address] A[address]  Has not eaten.

## 2021-10-31 NOTE — ED NOTES
TRANSFER - OUT REPORT:    [facility]  [facility] to [phone] for routine progression of care       Report consisted of patients Situation, Background, Assessment and   Recommendations(SBAR). Information from the following report(s) SBAR was reviewed with the receiving nurse. Lines:   Peripheral IV 10/31/21 Left Antecubital (Active)       Peripheral IV 10/31/21 Right Antecubital (Active)        Opportunity for questions and clarification was provided.       Patient transported with:   [email or web address]

## 2021-10-31 NOTE — ED NOTES
Unable to obtain a clear o2sat, pt continues to move around non stop, both hads and feet are cold, no luck in the earlobe or forehead. Pt able to speak in full sentences and yell that he is in pain after multiple dosages or Fentanyl and haldol were ordered and given. Pt placed on O2 NC 3lpm , continues to removed NC and states he needs to scream and readjust himself.

## 2021-10-31 NOTE — PROGRESS NOTES
INITIAL SPIRITUAL ASSESSMENT     NOTED:      PATIENT IS BEING ADMITTED TO FLOOR FROM ER    [facility]ae[facility]ISS    FRIEND - SERAFIN    LIVES IN [facility]    FULL CODE    NO DIRECTIVES ON FILE    WORKS FOR [employer]        WILL ASSESS HOW WE CAN BEST SERVE THIS FAMILY

## 2021-10-31 NOTE — ED PROVIDER NOTES
60-year-old male brought in by EMS. They were called to the scene who is home for bloody emesis and right leg pain. Patient said alcohol of the drinks daily. Has not been taking his medications. His right foot is cold and pulseless. Patient is having terrible pain in the right foot as well. Patient describes black tarry stool for several days. Patient had a blood clot in his right leg in 2005 did not require a stent. The history is provided by the patient. Vomiting   This is a new problem. The current episode started more than 2 days ago. The problem has been rapidly worsening. The emesis has an appearance of stomach contents. There has been no fever. Associated symptoms include arthralgias and myalgias. Pertinent negatives include no chills and no fever. His pertinent negatives include no DM. Past Medical History:   Diagnosis Date    CAD (coronary artery disease)     Hypertension     Hypertensive cardiomegaly 10/4/2016    Hypertensive heart disease, unspecified without heart failure 10/4/2016    Incomplete bladder emptying 10/4/2016    Thromboembolus ([address]        Past Surgical History:   Procedure Laterality Date    HX OTHER SURGICAL      DVT    RI LEFT HEART CATH,PERCUTANEOUS  6/14/2012    Xience to RCA         Family History:   Problem Relation Age of Onset    Heart Disease Father     Cancer Father     Heart Disease Mother     Cancer Sister     Diabetes Brother     Diabetes Maternal Grandmother     Cancer Maternal Grandmother        Social History     Socioeconomic History    Marital status:      Spouse name: Not on file    Number of children: Not on file    Years of education: Not on file    Highest education level: Not on file   Occupational History    Not on file   Tobacco Use    Smoking status: Current Every Day Smoker     Packs/day: 1.00     Years: 20.00     Pack years: 20.00    Smokeless tobacco: Never Used   Substance and Sexual Activity    Alcohol use:  Yes Alcohol/week: 5.8 standard drinks     Types: 7 Cans of beer per week    Drug use: Not on file    Sexual activity: Yes   Other Topics Concern    Not on file   Social History Narrative    Not on file     Social Determinants of Health     Financial Resource Strain:     Difficulty of Paying Living Expenses:    Food Insecurity:     Worried About Running Out of Food in the Last Year:     [address] in the Last Year:    Transportation Needs:     Lack of Transportation (Medical):  Lack of Transportation (Non-Medical):    Physical Activity:     Days of Exercise per Week:     Minutes of Exercise per Session:    Stress:     Feeling of Stress :    Social Connections:     Frequency of Communication with Friends and Family:     Frequency of Social Gatherings with Friends and Family:     Attends Islam Services:     Active Member of Clubs or Organizations:     Attends Club or Organization Meetings:     Marital Status:    Intimate Partner Violence:     Fear of Current or Ex-Partner:     Emotionally Abused:     Physically Abused:     Sexually Abused: ALLERGIES: Patient has no known allergies. Review of Systems   Constitutional: Positive for activity change and fatigue. Negative for chills and fever. HENT: Negative. Eyes: Negative. Respiratory: Negative. Cardiovascular: Negative. Gastrointestinal: Positive for vomiting. Genitourinary: Negative. Musculoskeletal: Positive for arthralgias and myalgias. Skin: Negative. Neurological: Negative. Psychiatric/Behavioral: Positive for agitation. All other systems reviewed and are negative. Vitals:    10/31/21 0613 10/31/21 0620 10/31/21 0623 10/31/21 0624   BP:   (!) 145/81 (!) 145/81   Pulse: (!) 122  (!) 108    Resp: 18  17    SpO2: 95% 94%     Weight: 72.6 kg (160 lb)      Height: 6' (1.829 m)               Physical Exam  Vitals and nursing note reviewed. Constitutional:       General: He is in acute distress. Appearance: He is well-developed. He is ill-appearing and toxic-appearing. HENT:      Head: Normocephalic and atraumatic. Right Ear: External ear normal.      Left Ear: External ear normal.      Nose: Nose normal.   Eyes:      General: No scleral icterus. Right eye: No discharge. Left eye: No discharge. Conjunctiva/sclera: Conjunctivae normal.      Pupils: Pupils are equal, round, and reactive to light. Cardiovascular:      Rate and Rhythm: Regular rhythm. Pulmonary:      Effort: Pulmonary effort is normal. No respiratory distress. Breath sounds: Normal breath sounds. No stridor. No wheezing or rales. Abdominal:      General: Bowel sounds are normal. There is no distension. Palpations: Abdomen is soft. Tenderness: There is no abdominal tenderness. Genitourinary:     Rectum: Guaiac result positive. Musculoskeletal:         General: Normal range of motion. Cervical back: Normal range of motion. Right foot: Decreased capillary refill. Abnormal pulse. Legs:    Skin:     General: Skin is warm and dry. Findings: No rash. Neurological:      Mental Status: He is alert and oriented to person, place, and time. Motor: No abnormal muscle tone. Coordination: Coordination normal.   Psychiatric:         Behavior: Behavior normal.          MDM  Number of Diagnoses or Management Options  Diagnosis management comments: Patient has several conflicting issues. Patient has black tarry stools guaiac positive he was vomiting blood earlier in the day what may have esophageal varices. Patient also has a pulseless foot from an arterial occlusion. Heparin is contraindicated at this time due to his massive not of bloody stool. GI was contacted they will follow up with the patient today patient be admitted to the hospital service vascular surgery was contacted they wanted to CTA with runoff which is in process.  Patient is having terrible pain and does try to control in the ER with some success but still uncomfortable.        Amount and/or Complexity of Data Reviewed  Clinical lab tests: ordered and reviewed  Tests in the radiology section of CPT®: ordered and reviewed  Tests in the medicine section of CPT®: ordered and reviewed  Discussion of test results with the performing providers: yes  Decide to obtain previous medical records or to obtain history from someone other than the patient: yes  Obtain history from someone other than the patient: yes  Review and summarize past medical records: yes  Discuss the patient with other providers: yes  Independent visualization of images, tracings, or specimens: yes    Risk of Complications, Morbidity, and/or Mortality  Presenting problems: high  Diagnostic procedures: high  Management options: high    Patient Progress  Patient progress: stable         EKG    Date/Time: 10/31/2021 7:42 AM  Performed by: Samara Paulino MD  Authorized by: Samara Paulino MD     ECG reviewed by ED Physician in the absence of a cardiologist: yes    Previous ECG:     Previous ECG:  Unavailable  Interpretation:     Interpretation: abnormal    Rate:     ECG rate assessment: tachycardic    Rhythm:     Rhythm: sinus rhythm    Ectopy:     Ectopy: none

## 2021-10-31 NOTE — H&P
Hospitalist History and Physical   Admit Date:  10/31/2021  6:12 AM   Name:  Meir Talley   Age:  62 y.o. Sex:  male  :  1960   MRN:  [id number]   Room:  [facility]/    Presenting Complaint: Vomiting (vomit red blood)    Reason(s) for Admission: GIB (gastrointestinal bleeding) [K92.2]  Ischemic leg pain [M79.606, I99.8]     History of Present Illness:   Meir Talley is a 62 y.o. male with medical history of CAD s/p PCI, PVD, HTN, HLD, alcohol abuse who presented to the hospital with c/o hematemesis and RLE pain. Patient somewhat confused on my evaluation and only able to answer some of my questions. Per chart review, patient presented to the ED via EMS after being called due to complaints of hematemesis and right lower extremity pain. Patient does state that he has been vomiting up blood for the past day, as most recently as prior to ER evaluation. Patient does admit to drinking alcohol, \"many bottles\" on daily basis. He denies any abdominal pain, or diarrhea. Bedside air nurse states that he has already seen large melanotic stools. Patient also complains of right lower extremity pain. He does have significantly bilateral lower extremities and difficult to palpate pulses on both feet. Patient states that his bilateral lower extremity pain has been ongoing for several months. Per chart review, patient most recently seen by outpatient provider in  and had issues with bilateral lower extremity pain at that time. He was supposed to be on aspirin, statin and other medications for his history of coronary disease as well. Patient states that only medication he is currently taking at home is baby aspirin. Review of Systems:  10 systems reviewed and negative except as noted in HPI. Assessment & Plan:      Active Problems:    #GIB (gastrointestinal bleeding)  - reports of hematemesis per EMS and e/o of melena by ER nurse in ED  - Hgb 13.6 on admission, down to 12.1 already   - start PPI IV BID   - monitor Hgb q8h, transfuse for Hgb<7  - consult GI, with potential plans for EGD tomorrow  - start liquid diet for now, NPO at MN      #Ischemic leg pain    - pulses RLE not palpable, chronic history of PVD and BLLE pain per chart review  - not taking ASA/statin at home on regular basis  - CT abd with runoff showing decreased flow to R SFA with occlusion distally  - consult vascular surgery, appreciate assistance  - cannot start on any heparin at this time due to above, will assess ability to start [facility] after EGD evaluation   - morphine prn for pain     #Acute Encephalopathy   - altered in ED,check CT head  - alcohol abuse with Ammonia 43, will hold off on Lactulose for now with frequent bloody diarrhea/BMs  - no e/o infection without significant leukocytosis, fever    #ESPINOZA  - Cr 1.42 on admission, previous 1.10  - likely due to volume loss  - start mIVF  - recheck in AM     #Alcohol Abuse  - reports 1 mini bottle liquor per day   - RUQ Abd US showing fatty liver  - Ammonia 43, start lactulose  - ativan prn for withdrawal   - start banana bag, thiamine, folate    #CAD  - cont statin  - hold ASA due to GIB  - hold BB/ACEi with borderline low BP's    #HTN  - noncompliant  - BP well controlled at this time     #HLD  - cont statin       Dispo/Discharge Planning:   Likely at least 2-3 midnight hospital stay pending clinical course.     Diet: ADULT DIET Full Liquid  DIET NPO Ice Chips  VTE ppx: none for now with ischemic leg, GIB  Code status: Full Code    Hospital Problems as of 10/31/2021 Date Reviewed: 4/12/2019        Codes Class Noted - Resolved POA    GIB (gastrointestinal bleeding) ICD-10-CM: K92.2  ICD-9-CM: 578.9  10/31/2021 - Present Unknown        Ischemic leg pain ICD-10-CM: M79.606, I99.8  ICD-9-CM: 459.9, 729.5  10/31/2021 - Present Unknown              Past History:  Past Medical History:   Diagnosis Date    CAD (coronary artery disease)     Hypertension     Hypertensive cardiomegaly 10/4/2016  Hypertensive heart disease, unspecified without heart failure 10/4/2016    Incomplete bladder emptying 10/4/2016    Thromboembolus ([facility] [address]      Past Surgical History:   Procedure Laterality Date    HX OTHER SURGICAL      DVT    DC LEFT HEART CATH,PERCUTANEOUS  6/14/2012    Xience to RCA      No Known Allergies   Social History     Tobacco Use    Smoking status: Current Every Day Smoker     Packs/day: 1.00     Years: 20.00     Pack years: 20.00    Smokeless tobacco: Never Used   Substance Use Topics    Alcohol use: Yes     Alcohol/week: 5.8 standard drinks     Types: 7 Cans of beer per week      Family History   Problem Relation Age of Onset    Heart Disease Father     Cancer Father     Heart Disease Mother     Cancer Sister     Diabetes Brother     Diabetes Maternal Grandmother     Cancer Maternal Grandmother       Family history reviewed and negative except as otherwise noted.     Immunization History   Administered Date(s) Administered    Influenza Vaccine 09/12/2016    Influenza Vaccine (Quad) Mdck Pf (>2 Yrs Flucelvax QUAD [id number]) 09/13/2017    Influenza Vaccine (Quad) PF (>6 Mo Flulaval, Fluarix, and >3 Yrs Afluria, Fluzone [id number]) 10/12/2018, 10/30/2019     Prior to Admit Medications:  Current Outpatient Medications   Medication Instructions    aspirin (ASPIRIN) 325 mg, Oral, DAILY    atorvastatin (LIPITOR) 40 mg, Oral, DAILY    carvediloL (COREG) 12.5 mg, Oral, 2 TIMES DAILY    lisinopriL (PRINIVIL, ZESTRIL) 20 mg, Oral, DAILY    sildenafil, antihypertensive, (REVATIO) 20 mg tablet 225 daily as needed for erectile dysfunction       Objective:     Patient Vitals for the past 24 hrs:   Temp Pulse Resp BP SpO2   10/31/21 1203 97 °F (36.1 °C) (!) 108 20 (!) 110/92 99 %   10/31/21 1106  (!) 110  100/65 100 %   10/31/21 1105 97.7 °F (36.5 °C)       10/31/21 0916  (!) 108 24 113/78    10/31/21 0915  (!) 109 (!) 63  97 %   10/31/21 0842     98 %   10/31/21 0751  (!) 112 25 136/65  10/31/21 0738  (!) 105 27 (!) 151/81 93 %   10/31/21 0737  (!) 104 26  99 %   10/31/21 0659  (!) 124 (!) 33 136/80    10/31/21 0624    (!) 145/81    10/31/21 0623  (!) 108 17 (!) 145/81    10/31/21 0620     94 %   10/31/21 0613  (!) 122 18  95 %     Oxygen Therapy  O2 Sat (%): 99 % (10/31/21 1203)  Pulse via Oximetry: 110 beats per minute (10/31/21 1106)  O2 Device: None (Room air) (10/31/21 0620)    Estimated body mass index is 21.7 kg/m² as calculated from the following:    Height as of this encounter: 6' (1.829 m). Weight as of this encounter: 72.6 kg (160 lb). No intake or output data in the 24 hours ending 10/31/21 1300      Physical Exam:  Blood pressure (!) 110/92, pulse (!) 108, temperature 97 °F (36.1 °C), resp. rate 20, height 6' (1.829 m), weight 72.6 kg (160 lb), SpO2 99 %. General:    Appears older than stated age, confused, agitated  Head:  Normocephalic, atraumatic  Eyes:  Sclerae appear normal.  Pupils equally round. ENT:  Nares appear normal, no drainage. Moist oral mucosa  Neck:  No restricted ROM. Trachea midline   CV:   RRR. No m/r/g. No jugular venous distension. Lungs:   CTAB. No wheezing, rhonchi, or rales. Respirations even, unlabored  Abdomen: Bowel sounds present. Soft, nontender, nondistended. Extremities: Nonpalpable pulses to BL feet, BLLE's cold to touch  Skin:     No rashes and normal coloration. Cold to touch  Neuro:  CN II-XII grossly intact. Sensation intact.   A&Ox2  Psych:  Restless    I have reviewed ordered lab tests and independently visualized imaging below:    Last 24hr Labs:  Recent Results (from the past 24 hour(s))   EKG, 12 LEAD, INITIAL    Collection Time: 10/31/21  6:12 AM   Result Value Ref Range    Ventricular Rate 115 BPM    Atrial Rate 115 BPM    P-R Interval 120 ms    QRS Duration 86 ms    Q-T Interval 340 ms    QTC Calculation (Bezet) 470 ms    Calculated P Axis 82 degrees    Calculated R Axis 80 degrees    Calculated T Axis 74 degrees    Diagnosis         Sinus tachycardia with Possible Premature atrial complexes with Aberrant   conduction  Right atrial enlargement  Nonspecific ST abnormality  Abnormal ECG  When compared with ECG of 14-JUN-2012 10:25,  Vent. rate has increased BY  65 BPM  ST now depressed in Inferior leads  ST now depressed in Lateral leads  Confirmed by [employer] ([id number]) on 10/31/2021 7:50:45 AM     TYPE & SCREEN    Collection Time: 10/31/21  6:27 AM   Result Value Ref Range    Crossmatch Expiration 11/03/2021,2359     ABO/Rh(D) Jyoti Wilkins POSITIVE     Antibody screen NEG    CBC WITH AUTOMATED DIFF    Collection Time: 10/31/21  6:31 AM   Result Value Ref Range    WBC 11.9 (H) 4.3 - 11.1 K/uL    RBC 4.42 4.23 - 5.6 M/uL    HGB 13.6 13.6 - 17.2 g/dL    HCT 42.6 41.1 - 50.3 %    MCV 96.4 79.6 - 97.8 FL    MCH 30.8 26.1 - 32.9 PG    MCHC 31.9 31.4 - 35.0 g/dL    RDW 15.1 (H) 11.9 - 14.6 %    PLATELET 392 357 - 567 K/uL    MPV 10.1 9.4 - 12.3 FL    ABSOLUTE NRBC 0.00 0.0 - 0.2 K/uL    DF AUTOMATED      NEUTROPHILS 59 43 - 78 %    LYMPHOCYTES 27 13 - 44 %    MONOCYTES 10 4.0 - 12.0 %    EOSINOPHILS 1 0.5 - 7.8 %    BASOPHILS 1 0.0 - 2.0 %    IMMATURE GRANULOCYTES 2 0.0 - 5.0 %    ABS. NEUTROPHILS 7.1 1.7 - 8.2 K/UL    ABS. LYMPHOCYTES 3.2 0.5 - 4.6 K/UL    ABS. MONOCYTES 1.2 0.1 - 1.3 K/UL    ABS. EOSINOPHILS 0.2 0.0 - 0.8 K/UL    ABS. BASOPHILS 0.1 0.0 - 0.2 K/UL    ABS. IMM.  GRANS. 0.2 0.0 - 0.5 K/UL   METABOLIC PANEL, COMPREHENSIVE    Collection Time: 10/31/21  6:31 AM   Result Value Ref Range    Sodium 139 138 - 145 mmol/L    Potassium 3.4 (L) 3.5 - 5.1 mmol/L    Chloride 107 98 - 107 mmol/L    CO2 20 (L) 21 - 32 mmol/L    Anion gap 12 7 - 16 mmol/L    Glucose 222 (H) 65 - 100 mg/dL    BUN 50 (H) 6 - 23 MG/DL    Creatinine 1.42 0.8 - 1.5 MG/DL    GFR est AA >60 >60 ml/min/1.73m2    GFR est non-AA 54 (L) >60 ml/min/1.73m2    Calcium 8.6 8.3 - 10.4 MG/DL    Bilirubin, total 0.4 0.2 - 1.1 MG/DL    ALT (SGPT) 14 12 - 65 U/L AST (SGOT) 9 (L) 15 - 37 U/L    Alk. phosphatase 79 50 - 136 U/L    Protein, total 5.9 (L) 6.3 - 8.2 g/dL    Albumin 2.6 (L) 3.5 - 5.0 g/dL    Globulin 3.3 2.3 - 3.5 g/dL    A-G Ratio 0.8 (L) 1.2 - 3.5     PROTHROMBIN TIME + INR    Collection Time: 10/31/21  6:31 AM   Result Value Ref Range    Prothrombin time 13.5 12.6 - 14.5 sec    INR 1.0         All Micro Results     None          Other Studies:  CTA ABD ART W RUNOFF W WO CONT    Result Date: 10/31/2021  HISTORY: 62years of age Male with pulseless and cold right foot. EXAM/TECHNIQUE: CTA ABD ART W RUNOFF W WO CONT Radiation reduction dose techniques were used for the study. Our CT scanner use one or all of the following- Automated exposure control, adjustment of the mA and/or KV according the patient size, iterative reconstruction. 3D Multiplanar reformations were performed at the workstation. COMPARISON: None. FINDINGS: Markedly limited and partially nondiagnostic CTA examination due to the patient demonstrating prominent motion artifact. The CT technologist reported that the patient was very uncooperative and moving with screaming throughout the entire exam procedure. VASCULAR FINDINGS: Visualized descending thoracic aorta is nonaneurysmal. The abdominal aorta is poorly evaluated due to motion artifact but is nonaneurysmal. There is prominent noncalcific plaque throughout the infrarenal abdominal aorta with moderate luminal narrowing distally just above the aortoiliac bifurcation. Celiac artery appears grossly patent. Circumflex artery branch vessels are overall not adequately evaluated. Superior mesenteric artery is patent proximally and not adequately evaluated distally. Inferior mesenteric artery is not adequately evaluated. Right renal artery is patent with atherosclerotic disease proximally with some mild luminal narrowing. Left renal artery is patent. There is inadequate evaluation of the aortoiliac bifurcation due to the motion artifact.  Right common iliac artery is diffusely occluded and poorly evaluated. Right internal iliac artery appears occluded with some apparent slight reconstitution in its branch vessels which demonstrate atherosclerotic disease. Left common iliac artery is patent with diffuse atherosclerotic disease. Left internal iliac artery appears grossly patent with diffuse atherosclerotic disease. Right external artery appears occluded proximally near the origin with apparent slight reconstitution beyond this level with diffuse atherosclerotic disease. Left external artery is grossly patent with diffuse atherosclerotic disease. Right Lower Extremity: Right common femoral artery is grossly patent with atherosclerotic narrowing. Right profunda femoral artery appears grossly patent. Right SFA is diminutive in caliber with diffuse atherosclerotic disease and is occluded distally. There is some apparent faint reconstitution in the right popliteal artery which demonstrates diffuse atherosclerotic disease. The right lower leg runoff vessels appear grossly patent very proximally and are not adequately evaluated beyond this level. Left Lower Extremity: Left common femoral artery is patent with atherosclerotic disease. The profunda femoral artery appears grossly patent. The left SFA is occluded within the distal aspect. Occlusion of a distal left SFA/proximal popliteal artery stent. There is reconstitution of the mid to distal left popliteal artery. There appears to be flow throughout the left lower leg runoff vessels which are poorly evaluated. NONVASCULAR FINDINGS: LOWER CHEST: The visualized lower lungs are without areas of prominent focal consolidation. No pleural effusion. ABDOMEN AND PELVIS: Liver is poorly evaluated but without evidence of definite focal lesions. Gallbladder poorly evaluated but not distended. The spleen appears unremarkable. Pancreas appears unremarkable. Bilateral adrenal glands appear grossly unremarkable.  Kidneys are without hydronephrosis and demonstrated symmetric enhancement. In the pelvis, the urinary bladder is decompressed. Prostate gland is unremarkable. No significant pelvic free fluid. There is a nonobstructive bowel gas pattern. Inadequate evaluation of bowel inflammatory changes given the prominent motion artifact. The abdominal wall is without evidence of definite acute abnormality. MUSCULOSKELETAL SYSTEM: There are multilevel degenerative changes of the thoracolumbar spine which are poorly evaluated given the motion artifact. Severely limited and partially nondiagnostic CTA examination with prominent atherosclerotic disease of the abdominal aorta and iliofemoral outflow tracts following major findings: Prominent noncalcific atherosclerotic disease of the abdominal aorta with some moderate luminal narrowing distally. Occlusion of the right common iliac artery and proximal right external iliac artery. Occlusion of right internal iliac artery. Trace flow within the mid to distal right external iliac artery. Right lower extremity: Diminutive flow within the proximal to mid right SFA with occlusion distally. Trace flow within the right popliteal artery. Inadequate evaluation of right lower leg runoff vessels beyond the very proximal aspect. Left lower extremity: Occlusion of the distal SFA as well as the distal left SFA/proximal popliteal artery stent. Likely grossly patency of the left lower leg runoff vessels. Recommend vascular surgery evaluation.        Medications Administered     0.9% sodium chloride infusion 1,000 mL     Admin Date  10/31/2021 Action  New Bag Dose  1,000 mL Rate  75 mL/hr Route  IntraVENous Administered By  Lindsay Mesa RN          atorvastatin (LIPITOR) tablet 40 mg     Admin Date  10/31/2021 Action  Given Dose  40 mg Route  Oral Administered By  Lindsay Mesa RN          diphenhydrAMINE (BENADRYL) injection 25 mg     Admin Date  10/31/2021 Action  Given Dose  25 mg Route  IntraVENous Administered By  Declan Horan RN          fentaNYL citrate (PF) injection 50 mcg     Admin Date  10/31/2021 Action  Given Dose  50 mcg Route  IntraVENous Administered By  Declan Horan RN           Admin Date  10/31/2021 Action  Given Dose  50 mcg Route  IntraVENous Administered By  Declan Horan RN          haloperidol lactate (HALDOL) injection 10 mg     Admin Date  10/31/2021 Action  Given Dose  10 mg Route  IntraMUSCular Administered By  Declan Horan RN          HYDROmorphone (DILAUDID) injection 1 mg     Admin Date  10/31/2021 Action  Given Dose  1 mg Route  IntraVENous Administered By  Kae Adame RN          iopamidoL (ISOVUE-370) 76 % injection 100 mL     Admin Date  10/31/2021 Action  Given Dose  100 mL Route  IntraVENous Administered By  Glenna Mcfadden, RT, R, CT          LORazepam (ATIVAN) injection 1 mg     Admin Date  10/31/2021 Action  Given Dose  1 mg Route  IntraVENous Administered By  Declan Horan RN          ondansetron [facility]) injection 4 mg     Admin Date  10/31/2021 Action  Given Dose  4 mg Route  IntraVENous Administered By  Kae Adame RN          pantoprazole (PROTONIX) 40 mg in 0.9% sodium chloride 10 mL injection     Admin Date  10/31/2021 Action  Given Dose  40 mg Route  IntraVENous Administered By  Declan Horan RN          sodium chloride 0.9 % bolus infusion 1,000 mL     Admin Date  10/31/2021 Action  New Bag Dose  1,000 mL Rate  1,000 mL/hr Route  IntraVENous Administered By  Declan Horan RN          sodium chloride 0.9 % bolus infusion 100 mL     Admin Date  10/31/2021 Action  New Bag Dose  100 mL Rate   Route  IntraVENous Administered By  Glenna Mcfadden, RT, R, CT                Signed:  Caitie Kee MD    Part of this note may have been written by using a voice dictation software. The note has been proof read but may still contain some grammatical/other typographical errors.

## 2021-10-31 NOTE — ED NOTES
Very difficult to obtain pulse oximeter reading but pt with dusky nailbeds ,cool extremities. dr Willam Harris made aware and is aware. Have attempted to obtain various pulse ox readings from fingers ,earlobes-unable  And Dr Willam Harris aware. REport to Fabiano Rowe

## 2021-11-01 ENCOUNTER — ANESTHESIA (OUTPATIENT)
Dept: ENDOSCOPY | Age: 61
DRG: 378 | End: 2021-11-01
Payer: COMMERCIAL

## 2021-11-01 ENCOUNTER — APPOINTMENT (OUTPATIENT)
Dept: ULTRASOUND IMAGING | Age: 61
DRG: 378 | End: 2021-11-01
Attending: NURSE PRACTITIONER
Payer: COMMERCIAL

## 2021-11-01 ENCOUNTER — ANESTHESIA EVENT (OUTPATIENT)
Dept: ENDOSCOPY | Age: 61
DRG: 378 | End: 2021-11-01
Payer: COMMERCIAL

## 2021-11-01 LAB
ANION GAP SERPL CALC-SCNC: 5 MMOL/L (ref 7–16)
BASOPHILS # BLD: 0.1 K/UL (ref 0–0.2)
BASOPHILS NFR BLD: 0 % (ref 0–2)
BUN SERPL-MCNC: 42 MG/DL (ref 6–23)
CALCIUM SERPL-MCNC: 8.3 MG/DL (ref 8.3–10.4)
CHLORIDE SERPL-SCNC: 110 MMOL/L (ref 98–107)
CO2 SERPL-SCNC: 25 MMOL/L (ref 21–32)
COVID-19 RAPID TEST, COVR: NOT DETECTED
CREAT SERPL-MCNC: 1.15 MG/DL (ref 0.8–1.5)
DIFFERENTIAL METHOD BLD: ABNORMAL
EOSINOPHIL # BLD: 0 K/UL (ref 0–0.8)
EOSINOPHIL NFR BLD: 0 % (ref 0.5–7.8)
ERYTHROCYTE [DISTWIDTH] IN BLOOD BY AUTOMATED COUNT: 15.6 % (ref 11.9–14.6)
GLUCOSE BLD STRIP.AUTO-MCNC: 104 MG/DL (ref 65–100)
GLUCOSE BLD STRIP.AUTO-MCNC: 107 MG/DL (ref 65–100)
GLUCOSE BLD STRIP.AUTO-MCNC: 117 MG/DL (ref 65–100)
GLUCOSE BLD STRIP.AUTO-MCNC: 78 MG/DL (ref 65–100)
GLUCOSE BLD STRIP.AUTO-MCNC: 97 MG/DL (ref 65–100)
GLUCOSE SERPL-MCNC: 131 MG/DL (ref 65–100)
HCT VFR BLD AUTO: 31.4 % (ref 41.1–50.3)
HGB BLD-MCNC: 10.4 G/DL (ref 13.6–17.2)
HGB BLD-MCNC: 9.7 G/DL (ref 13.6–17.2)
IMM GRANULOCYTES # BLD AUTO: 0.1 K/UL (ref 0–0.5)
IMM GRANULOCYTES NFR BLD AUTO: 1 % (ref 0–5)
LYMPHOCYTES # BLD: 1.8 K/UL (ref 0.5–4.6)
LYMPHOCYTES NFR BLD: 11 % (ref 13–44)
MAGNESIUM SERPL-MCNC: 2.6 MG/DL (ref 1.8–2.4)
MCH RBC QN AUTO: 31.4 PG (ref 26.1–32.9)
MCHC RBC AUTO-ENTMCNC: 33.1 G/DL (ref 31.4–35)
MCV RBC AUTO: 94.9 FL (ref 79.6–97.8)
MONOCYTES # BLD: 1.3 K/UL (ref 0.1–1.3)
MONOCYTES NFR BLD: 8 % (ref 4–12)
NEUTS SEG # BLD: 13.6 K/UL (ref 1.7–8.2)
NEUTS SEG NFR BLD: 81 % (ref 43–78)
NRBC # BLD: 0 K/UL (ref 0–0.2)
PLATELET # BLD AUTO: 183 K/UL (ref 150–450)
PMV BLD AUTO: 10.5 FL (ref 9.4–12.3)
POTASSIUM SERPL-SCNC: 4.6 MMOL/L (ref 3.5–5.1)
RBC # BLD AUTO: 3.31 M/UL (ref 4.23–5.6)
SERVICE CMNT-IMP: ABNORMAL
SERVICE CMNT-IMP: NORMAL
SERVICE CMNT-IMP: NORMAL
SODIUM SERPL-SCNC: 140 MMOL/L (ref 136–145)
SOURCE, COVRS: NORMAL
WBC # BLD AUTO: 16.8 K/UL (ref 4.3–11.1)

## 2021-11-01 PROCEDURE — 74011250636 HC RX REV CODE- 250/636: Performed by: ANESTHESIOLOGY

## 2021-11-01 PROCEDURE — 74011000250 HC RX REV CODE- 250: Performed by: STUDENT IN AN ORGANIZED HEALTH CARE EDUCATION/TRAINING PROGRAM

## 2021-11-01 PROCEDURE — 74011250636 HC RX REV CODE- 250/636: Performed by: NURSE ANESTHETIST, CERTIFIED REGISTERED

## 2021-11-01 PROCEDURE — 76060000031 HC ANESTHESIA FIRST 0.5 HR: Performed by: INTERNAL MEDICINE

## 2021-11-01 PROCEDURE — 74011000250 HC RX REV CODE- 250: Performed by: NURSE ANESTHETIST, CERTIFIED REGISTERED

## 2021-11-01 PROCEDURE — 2709999900 HC NON-CHARGEABLE SUPPLY: Performed by: INTERNAL MEDICINE

## 2021-11-01 PROCEDURE — 74011000250 HC RX REV CODE- 250

## 2021-11-01 PROCEDURE — 87635 SARS-COV-2 COVID-19 AMP PRB: CPT

## 2021-11-01 PROCEDURE — 85018 HEMOGLOBIN: CPT

## 2021-11-01 PROCEDURE — 0DB78ZX EXCISION OF STOMACH, PYLORUS, VIA NATURAL OR ARTIFICIAL OPENING ENDOSCOPIC, DIAGNOSTIC: ICD-10-PCS | Performed by: INTERNAL MEDICINE

## 2021-11-01 PROCEDURE — 83735 ASSAY OF MAGNESIUM: CPT

## 2021-11-01 PROCEDURE — 74011250636 HC RX REV CODE- 250/636: Performed by: STUDENT IN AN ORGANIZED HEALTH CARE EDUCATION/TRAINING PROGRAM

## 2021-11-01 PROCEDURE — 88312 SPECIAL STAINS GROUP 1: CPT

## 2021-11-01 PROCEDURE — 65660000000 HC RM CCU STEPDOWN

## 2021-11-01 PROCEDURE — 74011250636 HC RX REV CODE- 250/636: Performed by: FAMILY MEDICINE

## 2021-11-01 PROCEDURE — 88305 TISSUE EXAM BY PATHOLOGIST: CPT

## 2021-11-01 PROCEDURE — 76040000025: Performed by: INTERNAL MEDICINE

## 2021-11-01 PROCEDURE — C9113 INJ PANTOPRAZOLE SODIUM, VIA: HCPCS

## 2021-11-01 PROCEDURE — 77030021593 HC FCPS BIOP ENDOSC BSC -A: Performed by: INTERNAL MEDICINE

## 2021-11-01 PROCEDURE — 82962 GLUCOSE BLOOD TEST: CPT

## 2021-11-01 PROCEDURE — 36415 COLL VENOUS BLD VENIPUNCTURE: CPT

## 2021-11-01 PROCEDURE — 85025 COMPLETE CBC W/AUTO DIFF WBC: CPT

## 2021-11-01 PROCEDURE — 74011000250 HC RX REV CODE- 250: Performed by: FAMILY MEDICINE

## 2021-11-01 PROCEDURE — 74011250636 HC RX REV CODE- 250/636

## 2021-11-01 PROCEDURE — 2709999900 HC NON-CHARGEABLE SUPPLY

## 2021-11-01 PROCEDURE — 80048 BASIC METABOLIC PNL TOTAL CA: CPT

## 2021-11-01 PROCEDURE — 74011250637 HC RX REV CODE- 250/637: Performed by: NURSE PRACTITIONER

## 2021-11-01 RX ORDER — PROPOFOL 10 MG/ML
INJECTION, EMULSION INTRAVENOUS AS NEEDED
Status: DISCONTINUED | OUTPATIENT
Start: 2021-11-01 | End: 2021-11-01

## 2021-11-01 RX ORDER — IBUPROFEN 200 MG
1 TABLET ORAL EVERY 24 HOURS
Status: DISCONTINUED | OUTPATIENT
Start: 2021-11-01 | End: 2021-11-03 | Stop reason: HOSPADM

## 2021-11-01 RX ORDER — SODIUM CHLORIDE, SODIUM LACTATE, POTASSIUM CHLORIDE, CALCIUM CHLORIDE 600; 310; 30; 20 MG/100ML; MG/100ML; MG/100ML; MG/100ML
1000 INJECTION, SOLUTION INTRAVENOUS CONTINUOUS
Status: DISCONTINUED | OUTPATIENT
Start: 2021-11-01 | End: 2021-11-01 | Stop reason: HOSPADM

## 2021-11-01 RX ORDER — DIAZEPAM 10 MG/2ML
5 INJECTION INTRAMUSCULAR 3 TIMES DAILY
Status: DISCONTINUED | OUTPATIENT
Start: 2021-11-01 | End: 2021-11-02

## 2021-11-01 RX ORDER — PROPOFOL 10 MG/ML
INJECTION, EMULSION INTRAVENOUS AS NEEDED
Status: DISCONTINUED | OUTPATIENT
Start: 2021-11-01 | End: 2021-11-01 | Stop reason: HOSPADM

## 2021-11-01 RX ADMIN — DIAZEPAM 5 MG: 10 INJECTION, SOLUTION INTRAMUSCULAR; INTRAVENOUS at 21:02

## 2021-11-01 RX ADMIN — SODIUM CHLORIDE, SODIUM LACTATE, POTASSIUM CHLORIDE, AND CALCIUM CHLORIDE 1000 ML: 600; 310; 30; 20 INJECTION, SOLUTION INTRAVENOUS at 12:03

## 2021-11-01 RX ADMIN — Medication 5 ML: at 05:34

## 2021-11-01 RX ADMIN — MORPHINE SULFATE 2 MG: 2 INJECTION, SOLUTION INTRAMUSCULAR; INTRAVENOUS at 20:55

## 2021-11-01 RX ADMIN — SODIUM CHLORIDE 40 MG: 9 INJECTION INTRAMUSCULAR; INTRAVENOUS; SUBCUTANEOUS at 08:59

## 2021-11-01 RX ADMIN — FOLIC ACID: 5 INJECTION, SOLUTION INTRAMUSCULAR; INTRAVENOUS; SUBCUTANEOUS at 10:44

## 2021-11-01 RX ADMIN — PHENYLEPHRINE HYDROCHLORIDE 100 MCG: 10 INJECTION INTRAVENOUS at 12:25

## 2021-11-01 RX ADMIN — ZIPRASIDONE MESYLATE 10 MG: 20 INJECTION, POWDER, LYOPHILIZED, FOR SOLUTION INTRAMUSCULAR at 01:10

## 2021-11-01 RX ADMIN — PROPOFOL 30 MG: 10 INJECTION, EMULSION INTRAVENOUS at 12:26

## 2021-11-01 RX ADMIN — PROPOFOL 20 MG: 10 INJECTION, EMULSION INTRAVENOUS at 12:23

## 2021-11-01 RX ADMIN — PROPOFOL 20 MG: 10 INJECTION, EMULSION INTRAVENOUS at 12:25

## 2021-11-01 RX ADMIN — DIAZEPAM 5 MG: 10 INJECTION, SOLUTION INTRAMUSCULAR; INTRAVENOUS at 08:59

## 2021-11-01 RX ADMIN — PROPOFOL 50 MG: 10 INJECTION, EMULSION INTRAVENOUS at 12:21

## 2021-11-01 RX ADMIN — Medication 10 ML: at 14:01

## 2021-11-01 RX ADMIN — Medication 5 ML: at 21:03

## 2021-11-01 RX ADMIN — SODIUM CHLORIDE 40 MG: 9 INJECTION INTRAMUSCULAR; INTRAVENOUS; SUBCUTANEOUS at 20:55

## 2021-11-01 RX ADMIN — DIAZEPAM 5 MG: 10 INJECTION, SOLUTION INTRAMUSCULAR; INTRAVENOUS at 01:11

## 2021-11-01 NOTE — PROGRESS NOTES
Patient seen and examined. Patient admitted for questionable GI bleed. Was found to have no pulse in his right foot. Clinically patient has motor and sensory intact. He has no femoral pulse on the right his right thigh and calf are warm. I reviewed his CTA images which showed he had diffuse atherosclerotic disease with occluded right common iliac and external iliac artery with bilateral SFA disease and tibial disease. Patient also has occluded left SFA stent. Unsure if this is upper GI bleed ideally I would like to anticoagulate. Clinically patient not distal ischemic I think this is acute on chronic plaque as he has diffuse atherosclerotic disease inflow and outflow. I will I will get a baseline duplex study patient most likely will will need to have a intervention on his right iliac at some point in time.     Cyril Madsen

## 2021-11-01 NOTE — INTERVAL H&P NOTE
Update History & Physical 
 
The Patient's History and Physical of October 31, 2021 was reviewed with the patient and I examined the patient. There was no change. The surgical site was confirmed by the patient and me. Plan:  The risk, benefits, expected outcome, and alternative to the recommended procedure have been discussed with the patient. Patient understands and wants to proceed with the procedure.  
 
Electronically signed by Barby Woodson MD on 11/1/2021 at 12:07 PM

## 2021-11-01 NOTE — PROGRESS NOTES
Primary nurse, Debbie Montejo RN and GI Nurse, Ramya Mireles RN, at beside in GI prep. Checked restraints and skin with Nacho Lloyd.

## 2021-11-01 NOTE — PROGRESS NOTES
TRANSFER - OUT REPORT:    Verbal report given to Rose(name) on John  being transferred to [facility](unit) for routine progression of care       Report consisted of patients Situation, Background, Assessment and   Recommendations(SBAR). Information from the following report(s) SBAR was reviewed with the receiving nurse. Lines:   Peripheral IV 10/31/21 Right Antecubital (Active)   Site Assessment Clean, dry, & intact 11/01/21 0858   Phlebitis Assessment 0 11/01/21 0858   Infiltration Assessment 0 11/01/21 0858   Dressing Status Dry; Intact 11/01/21 1204   Dressing Type Tape;Transparent 11/01/21 0858   Hub Color/Line Status Green 11/01/21 1204   Alcohol Cap Used No 11/01/21 1204        Opportunity for questions and clarification was provided.       Patient transported with:   [employer]

## 2021-11-01 NOTE — PROGRESS NOTES
TRANSFER - IN REPORT:    Verbal report received from [facility] on [facility]  being received from GI recovery for routine progression of care      Report consisted of patients Situation, Background, Assessment and   Recommendations(SBAR). Information from the following report(s) SBAR, Kardex, ED Summary, Procedure Summary, Intake/Output, MAR and Recent Results was reviewed with the receiving nurse. Opportunity for questions and clarification was provided. Assessment completed upon patients arrival to unit and care assumed.

## 2021-11-01 NOTE — ANESTHESIA POSTPROCEDURE EVALUATION
Procedure(s):  ESOPHAGOGASTRODUODENOSCOPY (EGD) ROOM 707  ESOPHAGOGASTRODUODENAL (EGD) BIOPSY. total IV anesthesia    Anesthesia Post Evaluation      Multimodal analgesia: multimodal analgesia used between 6 hours prior to anesthesia start to PACU discharge  Patient location during evaluation: bedside  Patient participation: complete - patient participated  Level of consciousness: awake and alert  Pain score: 0  Pain management: adequate  Airway patency: patent  Anesthetic complications: no  Cardiovascular status: acceptable, blood pressure returned to baseline, hemodynamically stable and stable  Respiratory status: acceptable, spontaneous ventilation, unassisted and room air  Hydration status: acceptable  Post anesthesia nausea and vomiting:  none  Final Post Anesthesia Temperature Assessment:  Normothermia (36.0-37.5 degrees C)      INITIAL Post-op Vital signs:   Vitals Value Taken Time   /70 11/01/21 1235   Temp     Pulse 83 11/01/21 1242   Resp     SpO2 97 % 11/01/21 1242   Vitals shown include unvalidated device data.

## 2021-11-01 NOTE — PROCEDURES
Esophagogastroduodenoscopy    DATE of PROCEDURE: 11/1/2021    MEDICATIONS ADMINISTERED: MAC    INSTRUMENT: GIF    PROCEDURE:  After obtaining informed consent, the patient was placed in the left lateral position and sedated. The endoscope was advanced under direct vision without difficulty. The esophagus, stomach (including retroflexed views) and duodenum were evaluated. The patient was taken to the recovery area in stable condition. FINDINGS:  ESOPHAGUS:  There are no varices in the esophagus. There is a 2cm hiatal hernia noted in the distal esophagus. STOMACH:  A clean based ulcer noted in the antrum measuring 3-4mm in diameter. No stigmata of recent bleed. Biopsies taken from the edge and body. There is no PHG  DUODENUM: NOrmal    Estimated blood loss: 0-minimal     PLAN:  1. Etoh, smoking, NSAIDs cessation  2. BID PPI  3. F/up pathology  4.  EGD in 3 months    Trice Perez MD  [facility], [id number]

## 2021-11-01 NOTE — PROGRESS NOTES
Chart screened by  for potential discharge needs or concerns. None identified at this time. Medical treatment plan pending. May need vascular surgery intervention. Please notify/consult  if any discharge needs arise. Care Management Interventions  PCP Verified by CM:  Yes  Last Visit to PCP: 01/31/20  Discharge Durable Medical Equipment: No  Physical Therapy Consult: No  Occupational Therapy Consult: No  Speech Therapy Consult: No  Support Systems: Other Family Member(s), Friend/Neighbor  Confirm Follow Up Transport: Self  Discharge Location  Discharge Placement: Unable to determine at this time

## 2021-11-01 NOTE — PROGRESS NOTES
TRANSFER - IN REPORT:    Ver[facility]  being received from [phone] for ordered procedure      Report consisted of patients Situation, Background, Assessment and   Recommendations(SBAR). Information from the following report(s) SBAR, Intake/Output, MAR, Recent Results, Med Rec Status and Pre Procedure Checklist was reviewed with the receiving nurse. Opportunity for questions and clarification was provided.
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
## 2021-11-01 NOTE — ANESTHESIA PREPROCEDURE EVALUATION
Relevant Problems   RESPIRATORY SYSTEM   (+) Cigarette smoker   (+) Other emphysema (HCC)      CARDIOVASCULAR   (+) Coronary artery disease   (+) Essential hypertension, benign      RENAL FAILURE   (+) ESPINOZA (acute kidney injury) ([facility] [address]       Anesthetic History   No history of anesthetic complications            Review of Systems / Medical History  Patient summary reviewed and pertinent labs reviewed    Pulmonary    COPD: mild      Smoker         Neuro/Psych   Within defined limits           Cardiovascular    Hypertension: well controlled          CAD, PAD and cardiac stents (June 2012:  RCA BASIL)    Exercise tolerance: >4 METS     GI/Hepatic/Renal               Comments: GIB Endo/Other        Anemia (Hb 10.4)     Other Findings              Physical Exam    Airway  Mallampati: II  TM Distance: 4 - 6 cm  Neck ROM: normal range of motion   Mouth opening: Normal     Cardiovascular  Regular rate and rhythm,  S1 and S2 normal,  no murmur, click, rub, or gallop             Dental         Pulmonary  Breath sounds clear to auscultation               Abdominal         Other Findings            Anesthetic Plan    ASA: 3  Anesthesia type: total IV anesthesia          Induction: Intravenous  Anesthetic plan and risks discussed with: Patient and Family      Spoke with sister (POA)

## 2021-11-01 NOTE — PROGRESS NOTES
Problem: Pressure Injury - Risk of  Goal: *Prevention of pressure injury  Description: Document Jose Scale and appropriate interventions in the flowsheet.   Outcome: Progressing Towards Goal  Note: Pressure Injury Interventions:  Sensory Interventions: Assess changes in LOC    Moisture Interventions: Absorbent underpads    Activity Interventions: Increase time out of bed    Mobility Interventions: HOB 30 degrees or less    Nutrition Interventions: Document food/fluid/supplement intake                     Problem: Patient Education: Go to Patient Education Activity  Goal: Patient/Family Education  Outcome: Progressing Towards Goal     Problem: Patient Education: Go to Patient Education Activity  Goal: Patient/Family Education  Outcome: Progressing Towards Goal     Problem: Upper and Lower GI Bleed: Day 1  Goal: Off Pathway (Use only if patient is Off Pathway)  Outcome: Progressing Towards Goal  Goal: Activity/Safety  Outcome: Progressing Towards Goal  Goal: Consults, if ordered  Outcome: Progressing Towards Goal  Goal: Diagnostic Test/Procedures  Outcome: Progressing Towards Goal  Goal: Nutrition/Diet  Outcome: Progressing Towards Goal  Goal: Discharge Planning  Outcome: Progressing Towards Goal  Goal: Medications  Outcome: Progressing Towards Goal  Goal: Respiratory  Outcome: Progressing Towards Goal  Goal: Treatments/Interventions/Procedures  Outcome: Progressing Towards Goal  Goal: Psychosocial  Outcome: Progressing Towards Goal  Goal: *Optimal pain control at patient's stated goal  Outcome: Progressing Towards Goal  Goal: *Hemodynamically stable  Outcome: Progressing Towards Goal  Goal: *Demonstrates progressive activity  Outcome: Progressing Towards Goal     Problem: Upper and Lower GI Bleed: Day 2  Goal: Off Pathway (Use only if patient is Off Pathway)  Outcome: Progressing Towards Goal  Goal: Activity/Safety  Outcome: Progressing Towards Goal  Goal: Consults, if ordered  Outcome: Progressing Towards Goal  Goal: Diagnostic Test/Procedures  Outcome: Progressing Towards Goal  Goal: Nutrition/Diet  Outcome: Progressing Towards Goal  Goal: Discharge Planning  Outcome: Progressing Towards Goal  Goal: Medications  Outcome: Progressing Towards Goal  Goal: Respiratory  Outcome: Progressing Towards Goal  Goal: Treatments/Interventions/Procedures  Outcome: Progressing Towards Goal  Goal: Psychosocial  Outcome: Progressing Towards Goal  Goal: *Optimal pain control at patient's stated goal  Outcome: Progressing Towards Goal  Goal: *Hemodynamically stable  Outcome: Progressing Towards Goal  Goal: *Tolerating diet  Outcome: Progressing Towards Goal  Goal: *Demonstrates progressive activity  Outcome: Progressing Towards Goal     Problem: Upper and Lower GI Bleed: Day 3  Goal: Off Pathway (Use only if patient is Off Pathway)  Outcome: Progressing Towards Goal  Goal: Activity/Safety  Outcome: Progressing Towards Goal  Goal: Diagnostic Test/Procedures  Outcome: Progressing Towards Goal  Goal: Nutrition/Diet  Outcome: Progressing Towards Goal  Goal: Discharge Planning  Outcome: Progressing Towards Goal  Goal: Medications  Outcome: Progressing Towards Goal  Goal: Treatments/Interventions/Procedures  Outcome: Progressing Towards Goal  Goal: Psychosocial  Outcome: Progressing Towards Goal     Problem: Upper and Lower GI Bleed:  Discharge Outcomes  Goal: *Hemodynamically stable  Outcome: Progressing Towards Goal  Goal: *Lungs clear or at baseline  Outcome: Progressing Towards Goal  Goal: *Demonstrates independent activity or return to baseline  Outcome: Progressing Towards Goal  Goal: *Pain is controlled to three or less  Outcome: Progressing Towards Goal  Goal: *Verbalizes understanding and describes prescribed diet  Outcome: Progressing Towards Goal  Goal: *Tolerating diet  Outcome: Progressing Towards Goal  Goal: *Verbalizes name, dosage, time, side effects, and number of days to continue medications  Outcome: Progressing Towards Goal  Goal: *Anxiety reduced or absent  Outcome: Progressing Towards Goal  Goal: *Understands and describes signs and symptoms to report to providers(Stroke Metric)  Outcome: Progressing Towards Goal  Goal: *Describes follow-up/return visits to physicians  Outcome: Progressing Towards Goal  Goal: *Describes available resources and support systems  Outcome: Progressing Towards Goal     Problem: Pain  Goal: *Control of Pain  Outcome: Progressing Towards Goal  Goal: *PALLIATIVE CARE:  Alleviation of Pain  Outcome: Progressing Towards Goal     Problem: Patient Education: Go to Patient Education Activity  Goal: Patient/Family Education  Outcome: Progressing Towards Goal     Problem: Non-Violent Restraints  Goal: Removal from restraints as soon as assessed to be safe  Outcome: Progressing Towards Goal  Goal: No harm/injury to patient while restraints in use  Outcome: Progressing Towards Goal  Goal: Patient's dignity will be maintained  Outcome: Progressing Towards Goal  Goal: Patient Interventions  Outcome: Progressing Towards Goal     Problem: Falls - Risk of  Goal: *Absence of Falls  Description: Document Bard  Fall Risk and appropriate interventions in the flowsheet.   Outcome: Progressing Towards Goal     Problem: Patient Education: Go to Patient Education Activity  Goal: Patient/Family Education  Outcome: Progressing Towards Goal

## 2021-11-01 NOTE — PROGRESS NOTES
TRANSFER - OUT REPORT:    Verbal report given to AMRITA Spears on John  being transferred to GI lab for ordered procedure       Report consisted of patients Situation, Background, Assessment and   Recommendations(SBAR). Information from the following report(s) SBAR, Kardex, Intake/Output, MAR and Recent Results was reviewed with the receiving nurse. Lines:   Peripheral IV 10/31/21 Left Antecubital (Active)   Site Assessment Clean, dry, & intact 10/31/21 1925   Phlebitis Assessment 0 10/31/21 1925   Infiltration Assessment 0 10/31/21 1925   Dressing Status Clean, dry, & intact 10/31/21 1925   Dressing Type Tape;Transparent 10/31/21 1925   Hub Color/Line Status Capped 10/31/21 1213       Peripheral IV 10/31/21 Right Antecubital (Active)   Site Assessment Clean, dry, & intact 10/31/21 1925   Phlebitis Assessment 0 10/31/21 1925   Infiltration Assessment 0 10/31/21 1925   Dressing Status Clean, dry, & intact 10/31/21 1925   Dressing Type Tape;Transparent 10/31/21 1925   Hub Color/Line Status Capped 10/31/21 1213        Opportunity for questions and clarification was provided.       Patient transported with:   Transport and RN

## 2021-11-01 NOTE — PROGRESS NOTES
Sister's number is incorrect in chart, unable to reach. Friend, Radha not accepting calls when we are contacting her by phone.

## 2021-11-01 NOTE — PROGRESS NOTES
's follow-up with staff to explore patient/family needs. Chaplains remain available for care.      Gilson Patterson 69  Board Certified
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
## 2021-11-02 ENCOUNTER — APPOINTMENT (OUTPATIENT)
Dept: ULTRASOUND IMAGING | Age: 61
DRG: 378 | End: 2021-11-02
Attending: NURSE PRACTITIONER
Payer: COMMERCIAL

## 2021-11-02 LAB
ANION GAP SERPL CALC-SCNC: 3 MMOL/L (ref 7–16)
BUN SERPL-MCNC: 15 MG/DL (ref 6–23)
CALCIUM SERPL-MCNC: 8.2 MG/DL (ref 8.3–10.4)
CHLORIDE SERPL-SCNC: 106 MMOL/L (ref 98–107)
CK SERPL-CCNC: 5569 U/L (ref 21–215)
CO2 SERPL-SCNC: 28 MMOL/L (ref 21–32)
CREAT SERPL-MCNC: 0.88 MG/DL (ref 0.8–1.5)
ERYTHROCYTE [DISTWIDTH] IN BLOOD BY AUTOMATED COUNT: 15.6 % (ref 11.9–14.6)
GLUCOSE BLD STRIP.AUTO-MCNC: 100 MG/DL (ref 65–100)
GLUCOSE BLD STRIP.AUTO-MCNC: 105 MG/DL (ref 65–100)
GLUCOSE BLD STRIP.AUTO-MCNC: 105 MG/DL (ref 65–100)
GLUCOSE BLD STRIP.AUTO-MCNC: 107 MG/DL (ref 65–100)
GLUCOSE SERPL-MCNC: 95 MG/DL (ref 65–100)
HCT VFR BLD AUTO: 28.9 % (ref 41.1–50.3)
HGB BLD-MCNC: 9 G/DL (ref 13.6–17.2)
HGB BLD-MCNC: 9.1 G/DL (ref 13.6–17.2)
HGB BLD-MCNC: 9.2 G/DL (ref 13.6–17.2)
HGB BLD-MCNC: 9.3 G/DL (ref 13.6–17.2)
MAGNESIUM SERPL-MCNC: 2.3 MG/DL (ref 1.8–2.4)
MCH RBC QN AUTO: 31 PG (ref 26.1–32.9)
MCHC RBC AUTO-ENTMCNC: 32.2 G/DL (ref 31.4–35)
MCV RBC AUTO: 96.3 FL (ref 79.6–97.8)
NRBC # BLD: 0 K/UL (ref 0–0.2)
PLATELET # BLD AUTO: 168 K/UL (ref 150–450)
PMV BLD AUTO: 10.6 FL (ref 9.4–12.3)
POTASSIUM SERPL-SCNC: 3.9 MMOL/L (ref 3.5–5.1)
RBC # BLD AUTO: 3 M/UL (ref 4.23–5.6)
SERVICE CMNT-IMP: ABNORMAL
SERVICE CMNT-IMP: NORMAL
SODIUM SERPL-SCNC: 137 MMOL/L (ref 138–145)
WBC # BLD AUTO: 10.2 K/UL (ref 4.3–11.1)

## 2021-11-02 PROCEDURE — 82550 ASSAY OF CK (CPK): CPT

## 2021-11-02 PROCEDURE — 97530 THERAPEUTIC ACTIVITIES: CPT

## 2021-11-02 PROCEDURE — 83735 ASSAY OF MAGNESIUM: CPT

## 2021-11-02 PROCEDURE — 86580 TB INTRADERMAL TEST: CPT | Performed by: FAMILY MEDICINE

## 2021-11-02 PROCEDURE — 82962 GLUCOSE BLOOD TEST: CPT

## 2021-11-02 PROCEDURE — 85027 COMPLETE CBC AUTOMATED: CPT

## 2021-11-02 PROCEDURE — 65660000000 HC RM CCU STEPDOWN

## 2021-11-02 PROCEDURE — 93922 UPR/L XTREMITY ART 2 LEVELS: CPT

## 2021-11-02 PROCEDURE — 74011000250 HC RX REV CODE- 250: Performed by: FAMILY MEDICINE

## 2021-11-02 PROCEDURE — 2709999900 HC NON-CHARGEABLE SUPPLY

## 2021-11-02 PROCEDURE — 97162 PT EVAL MOD COMPLEX 30 MIN: CPT

## 2021-11-02 PROCEDURE — 74011250637 HC RX REV CODE- 250/637: Performed by: NURSE PRACTITIONER

## 2021-11-02 PROCEDURE — C9113 INJ PANTOPRAZOLE SODIUM, VIA: HCPCS

## 2021-11-02 PROCEDURE — 74011000250 HC RX REV CODE- 250: Performed by: STUDENT IN AN ORGANIZED HEALTH CARE EDUCATION/TRAINING PROGRAM

## 2021-11-02 PROCEDURE — 80048 BASIC METABOLIC PNL TOTAL CA: CPT

## 2021-11-02 PROCEDURE — 74011250637 HC RX REV CODE- 250/637: Performed by: STUDENT IN AN ORGANIZED HEALTH CARE EDUCATION/TRAINING PROGRAM

## 2021-11-02 PROCEDURE — 74011250636 HC RX REV CODE- 250/636

## 2021-11-02 PROCEDURE — 74011250636 HC RX REV CODE- 250/636: Performed by: STUDENT IN AN ORGANIZED HEALTH CARE EDUCATION/TRAINING PROGRAM

## 2021-11-02 PROCEDURE — 74011000250 HC RX REV CODE- 250

## 2021-11-02 PROCEDURE — 36415 COLL VENOUS BLD VENIPUNCTURE: CPT

## 2021-11-02 RX ORDER — ASPIRIN 81 MG/1
81 TABLET ORAL DAILY
Qty: 30 TABLET | Refills: 0 | Status: SHIPPED | OUTPATIENT
Start: 2021-11-02 | End: 2021-12-06

## 2021-11-02 RX ORDER — HYDROCODONE BITARTRATE AND ACETAMINOPHEN 5; 325 MG/1; MG/1
1 TABLET ORAL
Qty: 12 TABLET | Refills: 0 | Status: SHIPPED | OUTPATIENT
Start: 2021-11-02 | End: 2021-11-05

## 2021-11-02 RX ORDER — PANTOPRAZOLE SODIUM 40 MG/1
40 TABLET, DELAYED RELEASE ORAL 2 TIMES DAILY
Qty: 60 TABLET | Refills: 0 | Status: SHIPPED | OUTPATIENT
Start: 2021-11-02 | End: 2021-11-08 | Stop reason: SDUPTHER

## 2021-11-02 RX ADMIN — TUBERCULIN PURIFIED PROTEIN DERIVATIVE 5 UNITS: 5 INJECTION, SOLUTION INTRADERMAL at 15:51

## 2021-11-02 RX ADMIN — Medication 5 ML: at 21:25

## 2021-11-02 RX ADMIN — FOLIC ACID: 5 INJECTION, SOLUTION INTRAMUSCULAR; INTRAVENOUS; SUBCUTANEOUS at 10:30

## 2021-11-02 RX ADMIN — Medication 5 ML: at 05:26

## 2021-11-02 RX ADMIN — Medication 10 ML: at 13:17

## 2021-11-02 RX ADMIN — MORPHINE SULFATE 2 MG: 2 INJECTION, SOLUTION INTRAMUSCULAR; INTRAVENOUS at 20:18

## 2021-11-02 RX ADMIN — SODIUM CHLORIDE 40 MG: 9 INJECTION INTRAMUSCULAR; INTRAVENOUS; SUBCUTANEOUS at 09:56

## 2021-11-02 RX ADMIN — ATORVASTATIN CALCIUM 40 MG: 40 TABLET, FILM COATED ORAL at 09:56

## 2021-11-02 RX ADMIN — MORPHINE SULFATE 2 MG: 2 INJECTION, SOLUTION INTRAMUSCULAR; INTRAVENOUS at 07:49

## 2021-11-02 RX ADMIN — SODIUM CHLORIDE 40 MG: 9 INJECTION INTRAMUSCULAR; INTRAVENOUS; SUBCUTANEOUS at 20:18

## 2021-11-02 NOTE — PROGRESS NOTES
PT/OT recommend short-term rehab versus acute PT for few more days. Patient does want to go to short-term rehab but would like to stay 1 more day to get evaluated again by physical therapy tomorrow and possible home health on discharge. I will cancel discharge order for today. Anticipate discharge tomorrow with home health physical therapy. Will order PPD in case patient changes his mind to go to rehab on discharge.

## 2021-11-02 NOTE — PROGRESS NOTES
Patient seen and examined. Reviewed patient duplex study and CTA. Patient has diffuse atherosclerotic disease all the way up to the renals with an occluded right common iliac artery with bilateral common femoral and SFA occlusions. Long-term patient most likely will require an aortobifem. Clinically patient is not densely ischemic as the symptoms been going on for a while. I discussed that this with Dr. Faisal Aggarwal, was at agree to see the patient outpatient for further surgical recommendations.   From vascular standpoint can be discharged home    Embarrass Levo

## 2021-11-02 NOTE — PROGRESS NOTES
Vascular Surgery    Pt is scheduled to see Dr. Emily Law at [address] [place] [address] next Tuesday 11/9 at 12:45pm per Dr. Miguel Mccloud. The address is [address] [place] [address]  number [id number]    CARLEY Patel

## 2021-11-02 NOTE — PROGRESS NOTES
[address]   [address]. [address] FAX: [phone]         VASCULAR SURGERY FLOOR PROGRESS NOTE    Admit Date: 10/31/2021  POD: 1 Day Post-Op    Procedure:  Procedure(s):  ESOPHAGOGASTRODUODENOSCOPY (EGD) ROOM 707  ESOPHAGOGASTRODUODENAL (EGD) BIOPSY    Subjective:     Patient has no new complaints. Objective:     Vitals:  Blood pressure 129/67, pulse 79, temperature 98.1 °F (36.7 °C), resp. rate 16, height 6' (1.829 m), weight 160 lb (72.6 kg), SpO2 96 %. Temp (24hrs), Av °F (36.7 °C), Min:97.6 °F (36.4 °C), Max:98.6 °F (37 °C)      Intake / Output:    Intake/Output Summary (Last 24 hours) at 2021 0656  Last data filed at 2021 0442  Gross per 24 hour   Intake 330 ml   Output 1000 ml   Net -670 ml       Physical Exam:    Constitutional: he appears well-developed. No distress. HENT:   Head: Atraumatic. Eyes: Pupils are equal, round, and reactive to light. Neck: Normal range of motion. Cardiovascular: Regular rhythm. Pulmonary/Chest: Effort normal and breath sounds normal. No respiratory distress. Abdominal: Soft. Bowel sounds are normal. he exhibits no distension. There is no tenderness. There is no guarding. No hernia. Musculoskeletal: Normal range of motion. Neurological: He is alert. CN II- XII grossly intact  Vascular: Palpable left femoral pulse nonpalpable right femoral pulse    Labs:   Recent Labs     21  0542 21  2322 21  1430 21  0218   HGB 9.3* 9.0*   < > 10.4*   WBC 10.2  --   --  16.8*   K 3.9  --   --  4.6   GLU 95  --   --  131*    < > = values in this interval not displayed.        Data Review     Assessment:     Patient Active Problem List    Diagnosis Date Noted    GIB (gastrointestinal bleeding) 10/31/2021    Ischemic leg pain 10/31/2021    ESPINOZA (acute kidney injury) ([facility] [address] 10/31/2021    Erectile dysfunction 2020    History of angioplasty 2020    Cigarette smoker 10/30/2019    Multiple risk factors for coronary artery disease 10/30/2019    PVD (peripheral vascular disease) ([facility] [address] 10/30/2019    Other emphysema ([facility] [address] 07/13/2018    Hypertensive cardiomegaly 10/04/2016    Hypertensive heart disease, unspecified without heart failure 10/04/2016    Incomplete bladder emptying 10/04/2016    Coronary artery disease 06/14/2012    Tobacco abuse 06/14/2012    Essential hypertension, benign 06/14/2012    Mixed hyperlipidemia 06/14/2012       Plan/Recommendations/Medical Decision Making:     Patient is a more alert and oriented today and describes a several month of right leg and foot pain. He has history of tobacco abuse. Initially presented with upper GI bleed with no evidence of any active gastric ulcers  -I reviewed the CTA images show diffuse atherosclerotic disease in his aorta and occluded right common iliac and external iliac artery with bilateral SFA and tibial disease  -I believe these findings are subtle acute on chronic clinically patient does have some sensation loss on his right foot that he states been like that for several months  -With patient's history of alcohol abuse makes it difficult to determine if this is acute or most likely chronic in nature however he is at high risk for potential limb loss. -Ideally an aorto to femoral bypass will most likely be the best long-term solution but patient has significant amount of atherosclerotic disease and aorta as well just below the renals, the most efficient way to get blood would be a left to right femorofemoral bypass without also will put the left leg at risk as it also has SFA disease as well. I will get a baseline duplex study today and also order some CKs as patient has significant amount of pain in his calf. -With history of upper GI bleed and anticoagulation been contraindicated also makes patient is risk of amputation very high      Elements of this note have been dictated using speech recognition software.  As a result, errors of speech recognition may have occurred.

## 2021-11-02 NOTE — PROGRESS NOTES
ACUTE PHYSICAL THERAPY GOALS:  (Developed with and agreed upon by patient and/or caregiver. )  LTG:  (1.)Mr. Jh Alves will move from supine to sit and sit to supine, scoot up and down and roll side to side in bed INDEPENDENTLY with bed flat within 7 treatment day(s). (2.)Mr. Jh Alves will transfer from bed to chair and chair to bed with MODIFIED INDEPENDENCE using the least restrictive device within 7 treatment day(s). (3.)Mr. Jh Alves will ambulate with SUPERVISION for 350+ feet with the least restrictive device within 7 treatment day(s). (4.)Mr. Jh Alves will ascend and descend 3 steps with STAND BY ASSIST using handrail(s) within 7 days. (5.)Mr. Jh Alves will perform seated and standing exercises and functional activities x 15 minutes with contact guard assistance within 7 days. ________________________________________________________________________________________________      PHYSICAL THERAPY ASSESSMENT: Initial Assessment and AM PT Treatment Day # 1      Sarkis Lima is a 62 y.o. male   PRIMARY DIAGNOSIS: GIB (gastrointestinal bleeding)  GIB (gastrointestinal bleeding) [K92.2]  Ischemic leg pain [M79.606, I99.8]  Procedure(s) (LRB):  ESOPHAGOGASTRODUODENOSCOPY (EGD) ROOM 707 (N/A)  ESOPHAGOGASTRODUODENAL (EGD) BIOPSY (N/A)  1 Day Post-Op  Reason for Referral:  GI bleed, ischemic LE pain, encephalopathy  ICD-10: Treatment Diagnosis: Generalized Muscle Weakness (M62.81)  Difficulty in walking, Not elsewhere classified (R26.2)  Other abnormalities of gait and mobility (R26.89)  INPATIENT: Payor: BLUE CROSS / Plan: [facility] / Product Type: PPO /     ASSESSMENT:     REHAB RECOMMENDATIONS:   Recommendation to date pending progress:  Setting:   Short-term Rehab vs acute PT for a few more days? ?   Equipment:    3 in 1 Bedside Commode   Rolling Walker     PRIOR LEVEL OF FUNCTION:  (Prior to Hospitalization) INITIAL/CURRENT LEVEL OF FUNCTION:  (Most Recently Demonstrated)   Bed Mobility:   Independent  Sit to Stand:   Independent  Transfers:   Independent  Gait/Mobility:   Independent Bed Mobility:   Standby Assistance  Sit to Stand:  [employer] Assistance  Transfers:   Minimal Assistance  Gait/Mobility:   Minimal Assistance     ASSESSMENT:  Mr. Savage Pandey is admitted from home with GI bleed and ischemia right lower extremity pain. He does seem to have some chronic vascular issues so unclear currently what symptoms and complaints are new vs chronic. C/o right LE pain and does have clear right foot drop today making ambulation unsafe. Additionally he is quite weak and shaky in initial standing and fell back onto bed with first and second standing attempts. Provided with walker which did improve gait safety. Ambulation in room and hallway with RW, min assist, and unsafe gait mechanics with significant right foot drop, forward flexed posture. Returned to room and up to chair after activity, R LE with limited sensation. Pt has difficulty relating whether sensation and foot drop are new problems or have been ongoing prior to admission. In any case, he does not seem safe to dc home alone at this point due to weakness, fall risk, foot drop, and overall debility. Will benefit from continued therapy during hospital stay to maximize safety/independence with mobility. Based on evaluation today would have to recommend rehab at [facility]. SUBJECTIVE:   Mr. Savage Pandey states, \"I didn't notice that foot dropping before. \"    SOCIAL HISTORY/LIVING ENVIRONMENT: Lives alone. Independent. Drives fork lift. Mobile home with 3 steps to enter.    Home Environment: Trailer/mobile home  # Steps to Enter: 3  One/Two Story Residence: One story  Living Alone: Yes  Support Systems: Friend/Neighbor, Other Family Member(s)  OBJECTIVE:     PAIN: VITAL SIGNS: LINES/DRAINS:   Pre Treatment: Pain Screen  Pain Scale 1: Numeric (0 - 10)  Pain Intensity 1: 0  Post Treatment: 0/10 Vital Signs  O2 Device: None (Room air) IV  O2 Device: None (Room air)     GROSS EVALUATION:   Within Functional Limits Abnormal/ Functional Abnormal/ Non-Functional (see comments) Not Tested Comments:   AROM [] [x] [] [] Right foot drop   PROM [] [] [] []    Strength [] [x] [] []    Balance [] [x] [] []    Posture [] [x] [] []    Sensation [] [] [] []    Coordination [] [] [] []    Tone [] [] [] []    Edema [] [] [] []    Activity Tolerance [] [x] [] []     [] [] [] []      COGNITION/  PERCEPTION: Intact Impaired   (see comments) Comments:   Orientation [] [x]    Vision [] []    Hearing [] []    Command Following [] [x]    Safety Awareness [] [x]     [] []      MOBILITY: I Mod I S SBA CGA Min Mod Max Total  NT x2 Comments:   Bed Mobility    Rolling [] [] [] [x] [] [] [] [] [] [] []    Supine to Sit [] [] [] [x] [] [] [] [] [] [] []    Scooting [] [] [] [x] [] [] [] [] [] [] []    Sit to Supine [] [] [] [] [] [] [] [] [] [] []    Transfers    Sit to Stand [] [] [] [] [] [x] [] [] [] [] []    Bed to Chair [] [] [] [] [] [x] [] [] [] [] []    Stand to Sit [] [] [] [] [] [x] [] [] [] [] []    I=Independent, Mod I=Modified Independent, S=Supervision, SBA=Standby Assistance, CGA=Contact Guard Assistance,   Min=Minimal Assistance, Mod=Moderate Assistance, Max=Maximal Assistance, Total=Total Assistance, NT=Not Tested  GAIT: I Mod I S SBA CGA Min Mod Max Total  NT x2 Comments:   Level of Assistance [] [] [] [] [] [x] [] [] [] [] []    Distance 150'    DME Rolling Walker    Gait Quality Right foot drop, forward flexed posture    Weightbearing Status N/A     I=Independent, Mod I=Modified Independent, S=Supervision, SBA=Standby Assistance, CGA=Contact Guard Assistance,   Min=Minimal Assistance, Mod=Moderate Assistance, Max=Maximal Assistance, Total=Total Assistance, NT=Not Tested    [address] \[facility]\"" [address] [address] AM-PAC 6 Clicks   Basic Mobility Inpatient Short Form       How much difficulty does the patient currently have. .. Unable A Lot A Little None   1.   Turning over in bed (including adjusting bedclothes, sheets and blankets)? [] 1   [] 2   [] 3   [x] 4   2. Sitting down on and standing up from a chair with arms ( e.g., wheelchair, bedside commode, etc.)   [] 1   [] 2   [x] 3   [] 4   3. Moving from lying on back to sitting on the side of the bed? [] 1   [] 2   [] 3   [x] 4   How much help from another person does the patient currently need. .. Total A Lot A Little None   4. Moving to and from a bed to a chair (including a wheelchair)? [] 1   [] 2   [x] 3   [] 4   5. Need to walk in hospital room? [] 1   [] 2   [x] 3   [] 4   6. Climbing 3-5 steps with a railing? [] 1   [x] 2   [] 3   [] 4   © 2007, Trustees of [facility] [employer], under license to [employer]. All rights reserved     Score:  Initial: 19 Most Recent: X (Date: -- )    Interpretation of Tool:  Represents activities that are increasingly more difficult (i.e. Bed mobility, Transfers, Gait). PLAN:   FREQUENCY/DURATION: PT Plan of Care: 3 times/week for duration of hospital stay or until stated goals are met, whichever comes first.    PROBLEM LIST:   (Skilled intervention is medically necessary to address:)  1. Decreased Activity Tolerance  2. Decreased AROM/PROM  3. Decreased Balance  4. Decreased Cognition  5. Decreased Coordination  6. Decreased Gait Ability  7. Decreased Strength  8. Decreased Transfer Abilities   INTERVENTIONS PLANNED:   (Benefits and precautions of physical therapy have been discussed with the patient.)  1. Therapeutic Activity  2. Therapeutic Exercise/HEP  3. Neuromuscular Re-education  4. Gait Training  5. Manual Therapy  6. Education     TREATMENT:     EVALUATION: Moderate Complexity : (Untimed Charge)    TREATMENT:   ($$ Therapeutic Activity: 8-22 mins    )  Therapeutic Activity (10 Minutes):  Therapeutic activity included Rolling, Supine to Sit, Scooting, Transfer Training, Ambulation on level ground, Sitting balance  and Standing balance to improve functional Mobility, Strength, Activity tolerance and balance, gait mechanics.     TREATMENT GRID:  N/A    AFTER TREATMENT POSITION/PRECAUTIONS:  Chair, Needs within reach, RN notified and Visitors at bedside    INTERDISCIPLINARY COLLABORATION:  RN/PCT and PT/PTA    TOTAL TREATMENT DURATION:  PT Patient Time In/Time Out  Time In: 1230  Time Out: [address] Joana Rodriguez DPT

## 2021-11-02 NOTE — PROGRESS NOTES
Problem: Pressure Injury - Risk of  Goal: *Prevention of pressure injury  Description: Document Jose Scale and appropriate interventions in the flowsheet.   Outcome: Progressing Towards Goal  Note: Pressure Injury Interventions:  Sensory Interventions: Assess changes in LOC    Moisture Interventions: Absorbent underpads    Activity Interventions: Increase time out of bed    Mobility Interventions: HOB 30 degrees or less    Nutrition Interventions: Document food/fluid/supplement intake                     Problem: Patient Education: Go to Patient Education Activity  Goal: Patient/Family Education  Outcome: Progressing Towards Goal     Problem: Patient Education: Go to Patient Education Activity  Goal: Patient/Family Education  Outcome: Progressing Towards Goal     Problem: Upper and Lower GI Bleed: Day 2  Goal: Off Pathway (Use only if patient is Off Pathway)  Outcome: Progressing Towards Goal  Goal: Activity/Safety  Outcome: Progressing Towards Goal  Goal: Consults, if ordered  Outcome: Progressing Towards Goal  Goal: Diagnostic Test/Procedures  Outcome: Progressing Towards Goal  Goal: Nutrition/Diet  Outcome: Progressing Towards Goal  Goal: Discharge Planning  Outcome: Progressing Towards Goal  Goal: Medications  Outcome: Progressing Towards Goal  Goal: Respiratory  Outcome: Progressing Towards Goal  Goal: Treatments/Interventions/Procedures  Outcome: Progressing Towards Goal  Goal: Psychosocial  Outcome: Progressing Towards Goal  Goal: *Optimal pain control at patient's stated goal  Outcome: Progressing Towards Goal  Goal: *Hemodynamically stable  Outcome: Progressing Towards Goal  Goal: *Tolerating diet  Outcome: Progressing Towards Goal  Goal: *Demonstrates progressive activity  Outcome: Progressing Towards Goal     Problem: Upper and Lower GI Bleed: Day 3  Goal: Off Pathway (Use only if patient is Off Pathway)  Outcome: Progressing Towards Goal  Goal: Activity/Safety  Outcome: Progressing Towards Goal  Goal: Diagnostic Test/Procedures  Outcome: Progressing Towards Goal  Goal: Nutrition/Diet  Outcome: Progressing Towards Goal  Goal: Discharge Planning  Outcome: Progressing Towards Goal  Goal: Medications  Outcome: Progressing Towards Goal  Goal: Treatments/Interventions/Procedures  Outcome: Progressing Towards Goal  Goal: Psychosocial  Outcome: Progressing Towards Goal     Problem: Upper and Lower GI Bleed:  Discharge Outcomes  Goal: *Hemodynamically stable  Outcome: Progressing Towards Goal  Goal: *Lungs clear or at baseline  Outcome: Progressing Towards Goal  Goal: *Demonstrates independent activity or return to baseline  Outcome: Progressing Towards Goal  Goal: *Pain is controlled to three or less  Outcome: Progressing Towards Goal  Goal: *Verbalizes understanding and describes prescribed diet  Outcome: Progressing Towards Goal  Goal: *Tolerating diet  Outcome: Progressing Towards Goal  Goal: *Verbalizes name, dosage, time, side effects, and number of days to continue medications  Outcome: Progressing Towards Goal  Goal: *Anxiety reduced or absent  Outcome: Progressing Towards Goal  Goal: *Understands and describes signs and symptoms to report to providers(Stroke Metric)  Outcome: Progressing Towards Goal  Goal: *Describes follow-up/return visits to physicians  Outcome: Progressing Towards Goal  Goal: *Describes available resources and support systems  Outcome: Progressing Towards Goal     Problem: Pain  Goal: *Control of Pain  Outcome: Progressing Towards Goal     Problem: Patient Education: Go to Patient Education Activity  Goal: Patient/Family Education  Outcome: Progressing Towards Goal     Problem: Falls - Risk of  Goal: *Absence of Falls  Description: Document Paul Fall Risk and appropriate interventions in the flowsheet.   Outcome: Progressing Towards Goal     Problem: Patient Education: Go to Patient Education Activity  Goal: Patient/Family Education  Outcome: Progressing Towards Goal

## 2021-11-02 NOTE — DISCHARGE SUMMARY
Hospitalist Discharge Summary     Admit Date:  10/31/2021  6:12 AM   DC note date: 2021  Name:  Kala Dsouza   Age:  62 y.o.  :  1960   MRN:  [id number]   PCP:  Pia Al MD  Treatment Team: Attending Provider: Thaddeus Laguerre MD; Consulting Provider: Alexia David MD; Primary Nurse: Fercho Ortiz RN; Hospitalist: Janey Nuñez NP; Consulting Provider: Lulu Nava MD; Utilization Review: Chelita Meadows RN; Care Manager: Julian Greco [facility]; Charge Nurse: Tom May; Physical Therapist: Nissa Montes DPT; Occupational Therapist: Mary Ellen Mendes OT    Problem List for this Hospitalization:  Hospital Problems as of 2021 Date Reviewed: 2019        Codes Class Noted - Resolved POA    * (Principal) GIB (gastrointestinal bleeding) ICD-10-CM: K92.2  ICD-9-CM: 578.9  10/31/2021 - Present Unknown        Ischemic leg pain ICD-10-CM: M79.606, I99.8  ICD-9-CM: 459.9, 729.5  10/31/2021 - Present Unknown        ESPINOZA (acute kidney injury) [facility]) ICD-10-CM: N17.9  ICD-9-CM: 584.9  10/31/2021 - Present Unknown                Admission HPI from 10/31/2021:    \" Patient is a 62 y.o. who is seen in consultation at the request of Dr. Boston Wilhelm for GI bleed. Patient very agitated from R leg pain and did not focus on my questions. But from what I was able to get, he vomited red blood x 1 today, three melenic BM. Never has had GI bleed before. Our office does not have any records on him. He takes Aleve (most recently a few days ago). No CP, SOB, abd pains. He has severe constant R leg pain worsening over the past few months. According to Dr Samuel Sanchez note, patient had R leg blood clot, no stent. \"    Hospital Course:  Patient admitted with hematemesis, melena, ischemic right lower extremity pain, ESPINOZA and encephalopathy.   GI consulted and patient status post EGD on  which showed no varices, 2 cm hiatal hernia and clean base ulcer noted in the antrum measuring 3 to 4 mm diameter. No stigmata of recent bleed. Biopsy taken. GI recommend alcohol/smoking and NSAID cessation. Continue PPI twice daily and EGD in 3 months. Also GI recommend to avoid anticoagulation if possible as patient is at risk for bleeding until ulcer heals. Patient to follow-up with GI in 3 to 4 weeks. Hemoglobin remained stable. Patient also admitted with ischemic right lower extremity pain, chronic history of PVD and bilateral lower extremity pain. Peripheral pulses not palpable right lower extremity. CT with runoff showing decreased flow to right SFA with occlusion distally; occluded left SFA stent. Vascular surgery consulted and reviewed duplex study and CTA. Vascular surgery recommend patient has diffuse atherosclerotic disease all the way up to the renals with an occluded right common iliac artery with bilateral common femoral and SFA occlusion. Long-term patient most likely will require an aortobifem. Clinically patient is not densely ischemic. Dr. Sean Burgess discussed case with Dr. Kyle Rolle, who is agreed to see patient outpatient for further surgical recommendation. Unfortunately patient cannot start on anticoagulation due to high risk of GI bleed. Vascular surgery recommend enteric-coated aspirin. Pt is scheduled to see Dr. Kyle Rolle at [address] [place] [address] next Tuesday 11/9 at 12:45pm.  ESPINOZA improved with fluid resuscitation. Mentation improved. Patient with history of alcohol abuse. Ultrasound abdomen showed fatty liver. Patient started on CIWA protocol. All other chronic comorbidities stable and we continued essential home meds. Patient is stable to discharge home today with close follow-up with PCP, GI and vascular surgery. Disposition: Home Health Care Svc  Activity: Activity as tolerated  Diet: ADULT DIET Full Liquid  Code Status: Full Code    Follow Up Orders:   Follow-up Appointments   Procedures    FOLLOW UP VISIT Appointment in: 3 - 5 Days PCP GI in 1-2 weeks Vascular surgery as scheduled next week     PCP  GI in 1-2 weeks  Vascular surgery as scheduled next week     Standing Status:   Standing     Number of Occurrences:   1     Order Specific Question:   Appointment in     Answer:   3 - 5 Days       Follow-up Information     Follow up With Specialties Details Why Aide Ray MD Vascular Surgery Go on 11/9/2021 At 12:45pm  [address] [facility] [address], [address]   [address] [place] [address]. [address]  [phone]            Discharge meds at bottom of this note. Plan was discussed with patient. All questions answered. Patient was stable at time of discharge. Given instructions to call a physician or return if any concerns. Discharge summary and encounter summary was sent to PCP electronically via \"Comm Mgt\" link in [facility], if possible. Diagnostic Imaging/Tests:   CT HEAD WO CONT    Result Date: 10/31/2021  CT HEAD WITHOUT CONTRAST HISTORY:  Transient alteration of awareness. COMPARISON: None. TECHNIQUE: Axial imaging was performed without intravenous contrast utilizing 5mm slice thickness. Sagittal and coronal reformats were performed. Radiation dose reduction techniques were used for this study. Our CT scanner uses one or all of the following: Automated exposure control, adjustment of the MAS or KUB according to patient's size and iterative reconstruction. FINDINGS:    *BRAIN:    -  There are no early signs of territorial or lacunar infarction by CT.    -  No intracranial mass, hematoma, or hydrocephalus.    -  No gross white matter abnormality by CT. *VISUALIZED PARANASAL SINUSES: Well aerated. *MASTOIDS:  Clear. *CALVARIUM AND SCALP: Unremarkable.      Unremarkable brain CT without intravenous contrast. Date of Dictation: 10/31/2021 4:56 PM     CTA ABD ART W RUNOFF W WO CONT    Result Date: 10/31/2021  HISTORY: 60 years of age Male with pulseless and cold right foot. EXAM/TECHNIQUE: CTA ABD ART W RUNOFF W WO CONT Radiation reduction dose techniques were used for the study. Our CT scanner use one or all of the following- Automated exposure control, adjustment of the mA and/or KV according the patient size, iterative reconstruction. 3D Multiplanar reformations were performed at the workstation. COMPARISON: None. FINDINGS: Markedly limited and partially nondiagnostic CTA examination due to the patient demonstrating prominent motion artifact. The CT technologist reported that the patient was very uncooperative and moving with screaming throughout the entire exam procedure. VASCULAR FINDINGS: Visualized descending thoracic aorta is nonaneurysmal. The abdominal aorta is poorly evaluated due to motion artifact but is nonaneurysmal. There is prominent noncalcific plaque throughout the infrarenal abdominal aorta with moderate luminal narrowing distally just above the aortoiliac bifurcation. Celiac artery appears grossly patent. Circumflex artery branch vessels are overall not adequately evaluated. Superior mesenteric artery is patent proximally and not adequately evaluated distally. Inferior mesenteric artery is not adequately evaluated. Right renal artery is patent with atherosclerotic disease proximally with some mild luminal narrowing. Left renal artery is patent. There is inadequate evaluation of the aortoiliac bifurcation due to the motion artifact. Right common iliac artery is diffusely occluded and poorly evaluated. Right internal iliac artery appears occluded with some apparent slight reconstitution in its branch vessels which demonstrate atherosclerotic disease. Left common iliac artery is patent with diffuse atherosclerotic disease. Left internal iliac artery appears grossly patent with diffuse atherosclerotic disease.  Right external artery appears occluded proximally near the origin with apparent slight reconstitution beyond this level with diffuse atherosclerotic disease. Left external artery is grossly patent with diffuse atherosclerotic disease. Right Lower Extremity: Right common femoral artery is grossly patent with atherosclerotic narrowing. Right profunda femoral artery appears grossly patent. Right SFA is diminutive in caliber with diffuse atherosclerotic disease and is occluded distally. There is some apparent faint reconstitution in the right popliteal artery which demonstrates diffuse atherosclerotic disease. The right lower leg runoff vessels appear grossly patent very proximally and are not adequately evaluated beyond this level. Left Lower Extremity: Left common femoral artery is patent with atherosclerotic disease. The profunda femoral artery appears grossly patent. The left SFA is occluded within the distal aspect. Occlusion of a distal left SFA/proximal popliteal artery stent. There is reconstitution of the mid to distal left popliteal artery. There appears to be flow throughout the left lower leg runoff vessels which are poorly evaluated. NONVASCULAR FINDINGS: LOWER CHEST: The visualized lower lungs are without areas of prominent focal consolidation. No pleural effusion. ABDOMEN AND PELVIS: Liver is poorly evaluated but without evidence of definite focal lesions. Gallbladder poorly evaluated but not distended. The spleen appears unremarkable. Pancreas appears unremarkable. Bilateral adrenal glands appear grossly unremarkable. Kidneys are without hydronephrosis and demonstrated symmetric enhancement. In the pelvis, the urinary bladder is decompressed. Prostate gland is unremarkable. No significant pelvic free fluid. There is a nonobstructive bowel gas pattern. Inadequate evaluation of bowel inflammatory changes given the prominent motion artifact. The abdominal wall is without evidence of definite acute abnormality.  MUSCULOSKELETAL SYSTEM: There are multilevel degenerative changes of the thoracolumbar spine which are poorly evaluated given the motion artifact. Severely limited and partially nondiagnostic CTA examination with prominent atherosclerotic disease of the abdominal aorta and iliofemoral outflow tracts following major findings: Prominent noncalcific atherosclerotic disease of the abdominal aorta with some moderate luminal narrowing distally. Occlusion of the right common iliac artery and proximal right external iliac artery. Occlusion of right internal iliac artery. Trace flow within the mid to distal right external iliac artery. Right lower extremity: Diminutive flow within the proximal to mid right SFA with occlusion distally. Trace flow within the right popliteal artery. Inadequate evaluation of right lower leg runoff vessels beyond the very proximal aspect. Left lower extremity: Occlusion of the distal SFA as well as the distal left SFA/proximal popliteal artery stent. Likely grossly patency of the left lower leg runoff vessels. Recommend vascular surgery evaluation. Amen.    Result Date: 10/31/2021  RIGHT UPPER QUADRANT ULTRASOUND. HISTORY: Right upper quadrant abdominal pain. COMPARISON: None FINDINGS: The ultrasonographic Coleman's sign is reported as  negative. No gallstones. The gallbladder wall is not thickened. The common bile duct is not dilated, 5.9 mm. Intrahepatic biliary tree is not dilated. Included portion of the pancreas and right kidney are unremarkable. Diffuse fatty change of the liver. Diffuse fatty change of the liver. DUPLEX LOW EXT ARTERIES WITH JACEY    Result Date: 11/2/2021  TITLE: BILATERAL LOWER extremity arterial ultrasound examination. INDICATION: Peripheral arterial disease) PAD). TECHNIQUE: Grayscale, color, and Doppler interrogation performed. COMPARISON: CTA 10/31/2021. SEGMENTAL BP:  The right and left lower extremity segmental blood pressures are markedly reduced bilaterally, right greater than left.  No measurable PVR waveforms are identified bilaterally in the digits of the lower extremities. JACEY:  Right = 0.17            Left = 0.55 RIGHT LOWER EXTREMITY:  Peak systolic velocities-- 59, 67, 25, 34, 0, 0, 33, 28, 0, 17, 0 cm/sec. Occlusions present in the mid to distal right SFA and distal SFA. Reconstitution of flow is present in the right popliteal artery. The right peroneal and anterior tibial arteries are occluded, Monophasic waveforms throughout. Nanda Canton LEFT LOWER EXTREMITY:  Peak systolic velocities-- 430, 356, 305, 216, 225, 44, 25, 0, 0, 45, 36, 18, 60, 49 cm/sec. Occlusion of the mid to distal and distal left SFA. A stent is present in this occluded segment. Reconstitution of flow in the left popliteal artery. Biphasic waveforms present proximally with transition to monophasic waveforms in the mid left SFA. Nanda Canton ABDOMEN:  The distal abdominal aorta is without aneurysm. Peak systolic velocity in the distal abdominal aorta is 64 cm/s. The right common iliac artery is occluded. The peak systolic velocity in the left common iliac artery is 180 cm/s. 1.  RIGHT: Monophasic waveforms throughout the right lower extremity due to upstream occlusions in the iliacs as seen on the recent CTA. Occlusion of the mid to distal and distal right SFA with reconstitution of flow in the popliteal artery. Occlusion of the right peroneal and anterior tibial arteries. Right JACEY is 0.17. 2. LEFT: Moderate to high-grade stenosis in the distal left common femoral artery. Occlusion of the mid to distal and distal left SFA. A stent is present in this occluded segment. Reconstitution of flow in the popliteal artery. Left JACEY is 0.55. Echocardiogram results:  No results found for this visit on 10/31/21.     Procedures done this admission:  Procedure(s):  ESOPHAGOGASTRODUODENOSCOPY (EGD) ROOM 707  ESOPHAGOGASTRODUODENAL (EGD) BIOPSY    All Micro Results     Procedure Component Value Units Date/Time    COVID-19 RAPID TEST [[id number]] Collected: 11/01/21 0756    Order Status: Completed Specimen: Nasopharyngeal Updated: 11/01/21 0825     Specimen source NASAL        COVID-19 rapid test Not detected        Comment:      The specimen is NEGATIVE for SARS-CoV-2, the novel coronavirus associated with COVID-19. A negative result does not rule out COVID-19. This test has been authorized by the FDA under an Emergency Use Authorization (EUA) for use by authorized laboratories. Fact sheet for Healthcare Providers: [facility]  Fact sheet for Patients: Relative.ai.nz       Methodology: Isothermal Nucleic Acid Amplification               [unfilled]    Labs: Results:       BMP, Mg, Phos Recent Labs     11/02/21  0542 11/01/21 0218 10/31/21  0631   * 140 139   K 3.9 4.6 3.4*    110* 107   CO2 28 25 20*   AGAP 3* 5* 12   BUN 15 42* 50*   CREA 0.88 1.15 1.42   CA 8.2* 8.3 8.6   GLU 95 131* 222*   MG 2.3 2.6*  --       CBC Recent Labs     11/02/21  0959 11/02/21  0542 11/01/21  2322 11/01/21  1430 11/01/21  0218 10/31/21  1312 10/31/21  0631   WBC  --  10.2  --   --  16.8*  --  11.9*   RBC  --  3.00*  --   --  3.31*  --  4.42   HGB 9.2* 9.3* 9.0*   < > 10.4*   < > 13.6   HCT  --  28.9*  --   --  31.4*  --  42.6   PLT  --  168  --   --  183  --  255   GRANS  --   --   --   --  81*  --  59   LYMPH  --   --   --   --  11*  --  27   EOS  --   --   --   --  0*  --  1   MONOS  --   --   --   --  8  --  10   BASOS  --   --   --   --  0  --  1   IG  --   --   --   --  1  --  2   ANEU  --   --   --   --  13.6*  --  7.1   ABL  --   --   --   --  1.8  --  3.2   SONDRA  --   --   --   --  0.0  --  0.2   ABM  --   --   --   --  1.3  --  1.2   ABB  --   --   --   --  0.1  --  0.1   AIG  --   --   --   --  0.1  --  0.2    < > = values in this interval not displayed.       LFT Recent Labs     10/31/21  0631   ALT 14   AP 79   TP 5.9*   ALB 2.6*   GLOB 3.3   AGRAT 0.8*      Cardiac Testing Lab Results   Component Value Date/Time    CK 5,569 (H) 11/02/2021 05:42 AM      Coagulation Tests Lab Results   Component Value Date/Time    Prothrombin time 13.5 10/31/2021 06:31 AM    Prothrombin time 10.6 06/14/2012 09:40 AM    INR 1.0 10/31/2021 06:31 AM    INR 1.0 06/14/2012 09:40 AM    aPTT 26.5 06/14/2012 09:40 AM      A1c Lab Results   Component Value Date/Time    Hemoglobin A1c 5.5 10/31/2021 06:03 PM    Hemoglobin A1c, External 5.7 10/07/2014 12:00 AM      Lipid Panel Lab Results   Component Value Date/Time    Cholesterol, total 126 10/30/2019 12:07 PM    HDL Cholesterol 59 10/30/2019 12:07 PM    LDL,Direct 61 01/11/2019 02:57 PM    LDL, calculated 59 10/30/2019 12:07 PM    VLDL, calculated 8 10/30/2019 12:07 PM    Triglyceride 38 10/30/2019 12:07 PM      Thyroid Panel No results found for: TSH, T4, FT4, TT3, T3U, TSHEXT     Most Recent UA Lab Results   Component Value Date/Time    Color Yellow 10/30/2019 12:09 PM    pH (UA) 7.0 07/13/2018 04:13 PM    Protein Negative 10/30/2019 12:09 PM    Glucose Negative 10/30/2019 12:09 PM    Ketone Negative 10/30/2019 12:09 PM    Bilirubin Small (A) 10/30/2019 12:09 PM    Blood 6.0 10/30/2019 12:09 PM    Blood Negative 10/30/2019 12:09 PM    Urobilinogen 1.0 E.U./dL 10/30/2019 12:09 PM    Nitrites Negative 10/30/2019 12:09 PM    Leukocyte Esterase Negative 10/30/2019 12:09 PM    WBC None seen 03/16/2018 03:27 PM    RBC 0-5 (A) 07/13/2018 04:13 PM    Bacteria Trace (A) 07/13/2018 04:13 PM        No Known Allergies  Immunization History   Administered Date(s) Administered    Influenza Vaccine 09/12/2016    Influenza Vaccine (Quad) Mdck Pf (>2 Yrs Flucelvax QUAD [id number]) 09/13/2017    Influenza Vaccine [employer]) PF (>6 Mo Flulaval, Fluarix, and >3 Yrs Afluria, Fluzone [id number]) 10/12/2018, 10/30/2019       All Labs from Last 24 Hrs:  Recent Results (from the past 24 hour(s))   HEMOGLOBIN    Collection Time: 11/01/21  2:30 PM   Result Value Ref Range    HGB 9.7 (L) 13.6 - 17.2 g/dL   GLUCOSE, POC    Collection Time: 11/01/21  3:45 PM   Result Value Ref Range    Glucose (POC) 107 (H) 65 - 100 mg/dL    Performed by Ana Paula    GLUCOSE, POC    Collection Time: 11/01/21  9:32 PM   Result Value Ref Range    Glucose (POC) 104 (H) 65 - 100 mg/dL    Performed by [facility]    HEMOGLOBIN    Collection Time: 11/01/21 11:22 PM   Result Value Ref Range    HGB 9.0 (L) 13.6 - 00.6 g/dL   METABOLIC PANEL, BASIC    Collection Time: 11/02/21  5:42 AM   Result Value Ref Range    Sodium 137 (L) 138 - 145 mmol/L    Potassium 3.9 3.5 - 5.1 mmol/L    Chloride 106 98 - 107 mmol/L    CO2 28 21 - 32 mmol/L    Anion gap 3 (L) 7 - 16 mmol/L    Glucose 95 65 - 100 mg/dL    BUN 15 6 - 23 MG/DL    Creatinine 0.88 0.8 - 1.5 MG/DL    GFR est AA >60 >60 ml/min/1.73m2    GFR est non-AA >60 >60 ml/min/1.73m2    Calcium 8.2 (L) 8.3 - 10.4 MG/DL   CBC W/O DIFF    Collection Time: 11/02/21  5:42 AM   Result Value Ref Range    WBC 10.2 4.3 - 11.1 K/uL    RBC 3.00 (L) 4.23 - 5.6 M/uL    HGB 9.3 (L) 13.6 - 17.2 g/dL    HCT 28.9 (L) 41.1 - 50.3 %    MCV 96.3 79.6 - 97.8 FL    MCH 31.0 26.1 - 32.9 PG    MCHC 32.2 31.4 - 35.0 g/dL    RDW 15.6 (H) 11.9 - 14.6 %    PLATELET 136 373 - 553 K/uL    MPV 10.6 9.4 - 12.3 FL    ABSOLUTE NRBC 0.00 0.0 - 0.2 K/uL   MAGNESIUM    Collection Time: 11/02/21  5:42 AM   Result Value Ref Range    Magnesium 2.3 1.8 - 2.4 mg/dL   CK    Collection Time: 11/02/21  5:42 AM   Result Value Ref Range    CK 5,569 (H) 21 - 215 U/L   GLUCOSE, POC    Collection Time: 11/02/21  6:19 AM   Result Value Ref Range    Glucose (POC) 105 (H) 65 - 100 mg/dL    Performed by [facility]    HEMOGLOBIN    Collection Time: 11/02/21  9:59 AM   Result Value Ref Range    HGB 9.2 (L) 13.6 - 17.2 g/dL   GLUCOSE, POC    Collection Time: 11/02/21 11:09 AM   Result Value Ref Range    Glucose (POC) 100 65 - 100 mg/dL    Performed by [facility]        Discharge Exam:  Patient Vitals for the past 24 hrs:   Temp Pulse Resp BP SpO2   11/02/21 1108 98.6 °F (37 °C) 76 18 125/65 96 % 11/02/21 0950    120/67    11/02/21 0718 98.9 °F (37.2 °C) 88 17 130/72 100 %   11/02/21 0442 98.1 °F (36.7 °C) 79 16 129/67 96 %   11/02/21 0037 98.5 °F (36.9 °C) 87 16 134/68 97 %   11/02/21 0000  97      11/01/21 2040 98.6 °F (37 °C) 86 16 129/70 99 %   11/01/21 1552    91/60    11/01/21 1533 97.9 °F (36.6 °C) 92 16 (!) 88/59 96 %   11/01/21 1305  84 14 127/71 100 %     Oxygen Therapy  O2 Sat (%): 96 % (11/02/21 1108)  Pulse via Oximetry: 84 beats per minute (11/01/21 1243)  O2 Device: None (Room air) (11/02/21 0442)    Estimated body mass index is 21.7 kg/m² as calculated from the following:    Height as of this encounter: 6' (1.829 m). Weight as of this encounter: 72.6 kg (160 lb). Intake/Output Summary (Last 24 hours) at 11/2/2021 1252  Last data filed at 11/2/2021 1107  Gross per 24 hour   Intake 180 ml   Output 1100 ml   Net -920 ml       *Note that automatically entered I/Os may not be accurate; dependent on patient compliance with collection and accurate  by assistants. General:    Alert. No overt distress  Eyes:   Normal sclerae. Extraocular movements intact. ENT:  Normocephalic, atraumatic. Moist mucous membranes  CV:   Regular rate and rhythm. No murmur, rub, or gallop. Lungs:  Clear to auscultation bilaterally. No wheezing, rhonchi, or rales. Abdomen: Soft, nontender, nondistended. Extremities: Warm and dry. No cyanosis or edema. Right lower extremity peripheral pulses not palpable  Neurologic: CN II-XII grossly intact. No gross focal deficits   Skin:     No rashes or jaundice. Psych:  Normal mood and affect.     Current Med List in Hospital:   Current Facility-Administered Medications   Medication Dose Route Frequency    diazePAM (VALIUM) injection 5 mg  5 mg IntraVENous TID    nicotine (NICODERM CQ) 21 mg/24 hr patch 1 Patch  1 Patch TransDERmal Q24H    pantoprazole (PROTONIX) 40 mg in 0.9% sodium chloride 10 mL injection  40 mg IntraVENous Q12H  0.9% sodium chloride infusion 1,000 mL  1,000 mL IntraVENous CONTINUOUS    sodium chloride (NS) flush 5-40 mL  5-40 mL IntraVENous Q8H    sodium chloride (NS) flush 5-40 mL  5-40 mL IntraVENous PRN    ondansetron (ZOFRAN) injection 4 mg  4 mg IntraVENous Q4H PRN    LORazepam (ATIVAN) tablet 0.5 mg  0.5 mg Oral Q6H PRN    atorvastatin (LIPITOR) tablet 40 mg  40 mg Oral DAILY    morphine injection 2 mg  2 mg IntraVENous Q4H PRN    LORazepam (ATIVAN) tablet 1 mg  1 mg Oral Q1H PRN    LORazepam (ATIVAN) tablet 2 mg  2 mg Oral Q1H PRN    LORazepam (ATIVAN) injection 2 mg  2 mg IntraVENous Q2H PRN    atenoloL (TENORMIN) tablet 25 mg  25 mg Oral DAILY PRN    [START ON 65/7/7980] folic acid (FOLVITE) tablet 1 mg  1 mg Oral DAILY    [START ON 11/3/2021] thiamine HCL (B-1) tablet 100 mg  100 mg Oral DAILY    insulin lispro (HUMALOG) injection   SubCUTAneous AC&HS    dextrose 40% (GLUTOSE) oral gel 1 Tube  15 g Oral PRN    glucagon (GLUCAGEN) injection 1 mg  1 mg IntraMUSCular PRN    dextrose (D50W) injection syrg 12.5-25 g  25-50 mL IntraVENous PRN       Discharge Info:   Current Discharge Medication List      START taking these medications    Details   pantoprazole (PROTONIX) 40 mg tablet Take 1 Tablet by mouth two (2) times a day for 30 days. Qty: 60 Tablet, Refills: 0  Start date: 11/2/2021, End date: 12/2/2021      HYDROcodone-acetaminophen (NORCO) 5-325 mg per tablet Take 1 Tablet by mouth every six (6) hours as needed for Pain for up to 3 days. Max Daily Amount: 4 Tablets. Qty: 12 Tablet, Refills: 0  Start date: 11/2/2021, End date: 11/5/2021    Associated Diagnoses: Ischemic leg pain      aspirin delayed-release 81 mg tablet Take 1 Tablet by mouth daily for 30 days. Qty: 30 Tablet, Refills: 0  Start date: 11/2/2021, End date: 12/2/2021         CONTINUE these medications which have NOT CHANGED    Details   atorvastatin (LIPITOR) 40 mg tablet Take 1 Tab by mouth daily.   Qty: 90 Tab, Refills: 5 Associated Diagnoses: Coronary artery disease of native artery of native heart with stable angina pectoris (HCC)      carvedilol (COREG) 12.5 mg tablet Take 1 Tab by mouth two (2) times a day. Qty: 180 Tab, Refills: 5    Associated Diagnoses: Coronary artery disease of native artery of native heart with stable angina pectoris ([facility] [address] Elevated lipoprotein(a)      lisinopril (PRINIVIL, ZESTRIL) 20 mg tablet Take 1 Tab by mouth daily. Qty: 90 Tab, Refills: 5    Associated Diagnoses: Coronary artery disease of native artery of native heart with stable angina pectoris (HCC)      sildenafil, antihypertensive, (REVATIO) 20 mg tablet 225 daily as needed for erectile dysfunction  Qty: 50 Tab, Refills: 5    Comments: Please consider 90 day supplies to promote better adherence         STOP taking these medications       aspirin (ASPIRIN) 325 mg tablet Comments:   Reason for Stopping:                 Time spent in patient discharge planning and coordination 35 minutes.     Signed:  Zachary Wu MD

## 2021-11-02 NOTE — PROGRESS NOTES
[facility] Progress Note         Admit Date:  10/31/2021    Today's Date:  11/2/2021    CC:  GI Bleed    Subjective:     Patient denies BM or melena last night/this am.  Hgb stable at 9.3. Continued Rt leg pain. Duplex this am, vascular work up for occluded right common iliac and external iliac artery with bilateral SFA and tibial.     EGD 11/1/21 Dr Steph Gray  FINDINGS:   ESOPHAGUS:  There are no varices in the esophagus. There is a 2cm hiatal hernia noted in the distal esophagus. STOMACH:  A clean based ulcer noted in the antrum measuring 3-4mm in diameter. No stigmata of recent bleed. Biopsies taken from the edge and body. There is no PHG  DUODENUM: Normal  Estimated blood loss: 0-minimal    PLAN:  1. Etoh, smoking, NSAIDs cessation  2. BID PPI  3. F/up pathology  4. EGD in 3 months    Medications:   Current Facility-Administered Medications   Medication Dose Route Frequency    diazePAM (VALIUM) injection 5 mg  5 mg IntraVENous TID    nicotine (NICODERM CQ) 21 mg/24 hr patch 1 Patch  1 Patch TransDERmal Q24H    pantoprazole (PROTONIX) 40 mg in 0.9% sodium chloride 10 mL injection  40 mg IntraVENous Q12H    0.9% sodium chloride infusion 1,000 mL  1,000 mL IntraVENous CONTINUOUS    sodium chloride (NS) flush 5-40 mL  5-40 mL IntraVENous Q8H    sodium chloride (NS) flush 5-40 mL  5-40 mL IntraVENous PRN    ondansetron (ZOFRAN) injection 4 mg  4 mg IntraVENous Q4H PRN    LORazepam (ATIVAN) tablet 0.5 mg  0.5 mg Oral Q6H PRN    atorvastatin (LIPITOR) tablet 40 mg  40 mg Oral DAILY    morphine injection 2 mg  2 mg IntraVENous Q4H PRN    dextrose 5 % - 0.45% NaCl 1,000 mL with magnesium sulfate 2 g, folic acid 1 mg, thiamine 100 mg, mvi (adult no. 4 with vit K) 10 mL infusion   IntraVENous DAILY    LORazepam (ATIVAN) tablet 1 mg  1 mg Oral Q1H PRN    LORazepam (ATIVAN) tablet 2 mg  2 mg Oral Q1H PRN    LORazepam (ATIVAN) injection 2 mg  2 mg IntraVENous Q2H PRN    atenoloL (TENORMIN) tablet 25 mg  25 mg Oral DAILY PRN    [START ON 61/5/3173] folic acid (FOLVITE) tablet 1 mg  1 mg Oral DAILY    [START ON 11/3/2021] thiamine HCL (B-1) tablet 100 mg  100 mg Oral DAILY    insulin lispro (HUMALOG) injection   SubCUTAneous AC&HS    dextrose 40% (GLUTOSE) oral gel 1 Tube  15 g Oral PRN    glucagon (GLUCAGEN) injection 1 mg  1 mg IntraMUSCular PRN    dextrose (D50W) injection syrg 12.5-25 g  25-50 mL IntraVENous PRN       Review of Systems:  ROS was obtained, with pertinent positives as listed above. No chest pain or SOB. Diet:  Full liquid    Objective:   Vitals:  Visit Vitals  /67   Pulse 88   Temp 98.9 °F (37.2 °C)   Resp 17   Ht 6' (1.829 m)   Wt 72.6 kg (160 lb)   SpO2 100%   BMI 21.70 kg/m²     Intake/Output:  No intake/output data recorded. 10/31 1901 - 11/02 0700  In: 330 [P.O.:180; I.V.:150]  Out: 1000 [Urine:1000]  Exam:  General appearance: alert, cooperative, no distress  Lungs: clear to auscultation bilaterally anteriorly  Heart: regular rate and rhythm  Abdomen: soft, non-tender.  Bowel sounds normal. No masses, no organomegaly  Extremities: extremities normal, atraumatic, no cyanosis or edema  Neuro:  alert and oriented    Data Review (Labs):    Recent Labs     11/02/21  0542 11/01/21  2322 11/01/21  1430 11/01/21  0218 10/31/21  1803 10/31/21  1312 10/31/21  0631   WBC 10.2  --   --  16.8*  --   --  11.9*   HGB 9.3* 9.0* 9.7* 10.4* 11.3* 12.1* 13.6   HCT 28.9*  --   --  31.4*  --   --  42.6     --   --  183  --   --  255   MCV 96.3  --   --  94.9  --   --  96.4   *  --   --  140  --   --  139   K 3.9  --   --  4.6  --   --  3.4*     --   --  110*  --   --  107   CO2 28  --   --  25  --   --  20*   BUN 15  --   --  42*  --   --  50*   CREA 0.88  --   --  1.15  --   --  1.42   CA 8.2*  --   --  8.3  --   --  8.6   MG 2.3  --   --  2.6*  --   --   --    GLU 95  --   --  131*  --   --  222*   AP  --   --   --   --   --   --  79   AST  --   --   --   --   -- --  9*   ALT  --   --   --   --   --   --  14   TBILI  --   --   --   --   --   --  0.4   ALB  --   --   --   --   --   --  2.6*   TP  --   --   --   --   --   --  5.9*   PTP  --   --   --   --   --   --  13.5   INR  --   --   --   --   --   --  1.0       Assessment:     Principal Problem:    GIB (gastrointestinal bleeding) (10/31/2021)    Active Problems:    Ischemic leg pain (10/31/2021)      ESPINOZA (acute kidney injury) ([facility] [address] (10/31/2021)    Patient is a 62 y.o. who is seen in consultation at the request of Dr. Leigh Brock for GI bleed. Patient  vomited red blood x 1 and had three melenic BM 2 days ago. He takes Aleve. He has severe constant R leg pain worsening over the past few months. EGD yesterday with no varices or PHG and a clean based ulcer noted in the antrum measuring 3-4mm in diameter. Biopsies pending     Being worked up by vascular surgery for diffuse atherosclerotic disease in his aorta and occluded right common iliac and external iliac artery with bilateral SFA and tibial.  Baseline duplex this am.       He reports last colo 6 years ago was negative    Plan:     - Vascular work up with possible surgery pending   - No Aleve, discussed with patient  - Repeat EGD 3 months, follow path    Xi Henley NP  Patient is seen and examined in collaboration with Dr. Keysha Hernandez. Assessment and plan as per Dr. Darlys Buerger.

## 2021-11-03 ENCOUNTER — HOME HEALTH ADMISSION (OUTPATIENT)
Dept: HOME HEALTH SERVICES | Facility: HOME HEALTH | Age: 61
End: 2021-11-03
Payer: COMMERCIAL

## 2021-11-03 VITALS
HEIGHT: 72 IN | RESPIRATION RATE: 20 BRPM | BODY MASS INDEX: 21.67 KG/M2 | WEIGHT: 160 LBS | DIASTOLIC BLOOD PRESSURE: 61 MMHG | SYSTOLIC BLOOD PRESSURE: 95 MMHG | HEART RATE: 79 BPM | TEMPERATURE: 98.1 F | OXYGEN SATURATION: 100 %

## 2021-11-03 LAB
ANION GAP SERPL CALC-SCNC: 4 MMOL/L (ref 7–16)
BUN SERPL-MCNC: 9 MG/DL (ref 6–23)
CALCIUM SERPL-MCNC: 8.5 MG/DL (ref 8.3–10.4)
CHLORIDE SERPL-SCNC: 106 MMOL/L (ref 98–107)
CO2 SERPL-SCNC: 27 MMOL/L (ref 21–32)
CREAT SERPL-MCNC: 0.83 MG/DL (ref 0.8–1.5)
ERYTHROCYTE [DISTWIDTH] IN BLOOD BY AUTOMATED COUNT: 15.4 % (ref 11.9–14.6)
GLUCOSE BLD STRIP.AUTO-MCNC: 111 MG/DL (ref 65–100)
GLUCOSE BLD STRIP.AUTO-MCNC: 80 MG/DL (ref 65–100)
GLUCOSE SERPL-MCNC: 99 MG/DL (ref 65–100)
HCT VFR BLD AUTO: 27.7 % (ref 41.1–50.3)
HGB BLD-MCNC: 9 G/DL (ref 13.6–17.2)
HGB BLD-MCNC: 9.6 G/DL (ref 13.6–17.2)
MCH RBC QN AUTO: 30.6 PG (ref 26.1–32.9)
MCHC RBC AUTO-ENTMCNC: 32.5 G/DL (ref 31.4–35)
MCV RBC AUTO: 94.2 FL (ref 79.6–97.8)
NRBC # BLD: 0 K/UL (ref 0–0.2)
PLATELET # BLD AUTO: 178 K/UL (ref 150–450)
PMV BLD AUTO: 10.5 FL (ref 9.4–12.3)
POTASSIUM SERPL-SCNC: 3.9 MMOL/L (ref 3.5–5.1)
RBC # BLD AUTO: 2.94 M/UL (ref 4.23–5.6)
SERVICE CMNT-IMP: ABNORMAL
SERVICE CMNT-IMP: NORMAL
SODIUM SERPL-SCNC: 137 MMOL/L (ref 138–145)
WBC # BLD AUTO: 9.8 K/UL (ref 4.3–11.1)

## 2021-11-03 PROCEDURE — 80048 BASIC METABOLIC PNL TOTAL CA: CPT

## 2021-11-03 PROCEDURE — 97535 SELF CARE MNGMENT TRAINING: CPT

## 2021-11-03 PROCEDURE — 85018 HEMOGLOBIN: CPT

## 2021-11-03 PROCEDURE — C9113 INJ PANTOPRAZOLE SODIUM, VIA: HCPCS

## 2021-11-03 PROCEDURE — 74011000250 HC RX REV CODE- 250

## 2021-11-03 PROCEDURE — 97530 THERAPEUTIC ACTIVITIES: CPT

## 2021-11-03 PROCEDURE — 85027 COMPLETE CBC AUTOMATED: CPT

## 2021-11-03 PROCEDURE — 82962 GLUCOSE BLOOD TEST: CPT

## 2021-11-03 PROCEDURE — 97165 OT EVAL LOW COMPLEX 30 MIN: CPT

## 2021-11-03 PROCEDURE — 74011250636 HC RX REV CODE- 250/636

## 2021-11-03 PROCEDURE — 36415 COLL VENOUS BLD VENIPUNCTURE: CPT

## 2021-11-03 PROCEDURE — 74011250637 HC RX REV CODE- 250/637: Performed by: STUDENT IN AN ORGANIZED HEALTH CARE EDUCATION/TRAINING PROGRAM

## 2021-11-03 RX ADMIN — FOLIC ACID 1 MG: 1 TABLET ORAL at 08:31

## 2021-11-03 RX ADMIN — Medication 5 ML: at 05:14

## 2021-11-03 RX ADMIN — ATORVASTATIN CALCIUM 40 MG: 40 TABLET, FILM COATED ORAL at 08:30

## 2021-11-03 RX ADMIN — Medication 100 MG: at 08:30

## 2021-11-03 NOTE — DISCHARGE INSTRUCTIONS
Patient Education        Deep Vein Thrombosis: Care Instructions  Overview     A deep vein thrombosis (DVT) is a blood clot in certain veins, usually in the legs, pelvis, or arms. Blood clots in these veins need to be treated because they can get bigger, break loose, and travel through the bloodstream to the lungs. A blood clot in a lung can be life-threatening. The doctor may have given you a blood thinner (anticoagulant). A blood thinner can stop the blood clot from growing larger and prevent new clots from forming. You will need to take a blood thinner for at least 3 months. The doctor has checked you carefully, but problems can develop later. If you notice any problems or new symptoms, get medical treatment right away. Follow-up care is a key part of your treatment and safety. Be sure to make and go to all appointments, and call your doctor if you are having problems. It's also a good idea to know your test results and keep a list of the medicines you take. How can you care for yourself at home? · Take your medicines exactly as prescribed. Call your doctor if you think you are having a problem with your medicine. · If you are taking a blood thinner, be sure you get instructions about how to take your medicine safely. Blood thinners can cause serious bleeding problems. · Try to walk several times a day. · Wear compression stockings if your doctor recommends them. These stockings are tighter at the feet than on the legs. They may reduce pain and swelling in your legs. But there are different types of stockings, and they need to fit right. So your doctor will recommend what you need. · When you sit, use a pillow to raise the arm or leg that has the blood clot. Try to keep it above the level of your heart. When should you call for help? Call 911 anytime you think you may need emergency care.  For example, call if:    · You passed out (lost consciousness).     · You have symptoms of a blood clot in your lung (called a pulmonary embolism). These include:  ? Sudden chest pain. ? Trouble breathing. ? Coughing up blood. Call your doctor now or seek immediate medical care if:    · You have new or worse trouble breathing.     · You are dizzy or lightheaded, or you feel like you may faint.     · You have symptoms of a blood clot in your arm or leg. These may include:  ? Pain in the arm, calf, back of the knee, thigh, or groin. ? Redness and swelling in the arm, leg, or groin. Watch closely for changes in your health, and be sure to contact your doctor if:    · You do not get better as expected. Where can you learn more? Go to http://www.gray.com/  Enter N134 in the search box to learn more about \"Deep Vein Thrombosis: Care Instructions. \"  Current as of: July 6, 2021               Content Version: 13.0  © 1418-2420 [employer]. Care instructions adapted under license by [employer] (which disclaims liability or warranty for this information). If you have questions about a medical condition or this instruction, always ask your healthcare professional. [place] [address] any warranty or liability for your use of this information.        You have an appointment to discuss your leg issues with Dr. Radha Sarkar at [address] [place] [address] next Tuesday 11/9 at 12:45pm per Dr. Ursula Denton.      The address is [address] [place] [address]  number [phone]

## 2021-11-03 NOTE — PROGRESS NOTES
[facility] [place] liaison notified SW that pt wishes to change PCP provider to [facility].   Referral submitted to [place] [id number] with this request.

## 2021-11-03 NOTE — PROGRESS NOTES
[facility] November 3, 2021       RE: Howard Chong      To Whom It May Concern,    This is to certify that Howard Chong has been hospitalized from 10/31/2021 - 11/03/2021. Please feel free to contact my office if you have any questions or concerns. Thank you for your assistance in this matter.       Sincerely,  Manfred Perez RN   [phone]

## 2021-11-03 NOTE — PROGRESS NOTES
Patient seen and examined. Patient continues to refuse rehab. As such we will discharge patient home with home health. Patient encouraged to be compliant with PT OT recommendations at home and to follow-up with his vascular surgeon at [address] on Tuesday 11/9 at 12:45 PM and to follow-up with gastroenterology in 3 to 4 weeks.   For further details please refer to discharge summary performed on 11/2/2021

## 2021-11-03 NOTE — PROGRESS NOTES
ACUTE PHYSICAL THERAPY GOALS:  (Developed with and agreed upon by patient and/or caregiver. )  LTG:  (1.)Mr. Hung Galvan will move from supine to sit and sit to supine, scoot up and down and roll side to side in bed INDEPENDENTLY with bed flat within 7 treatment day(s). (2.)Mr. Hung Galvan will transfer from bed to chair and chair to bed with MODIFIED INDEPENDENCE using the least restrictive device within 7 treatment day(s). (3.)Mr. Hung Galvan will ambulate with SUPERVISION for 350+ feet with the least restrictive device within 7 treatment day(s). (4.)Mr. Hung Galvan will ascend and descend 3 steps with STAND BY ASSIST using handrail(s) within 7 days. (5.)Mr. Hung Galvan will perform seated and standing exercises and functional activities x 15 minutes with contact guard assistance within 7 days. PHYSICAL THERAPY: Daily Note and AM Treatment Day # 2    Shila Joe is a 62 y.o. male   PRIMARY DIAGNOSIS: GIB (gastrointestinal bleeding)  GIB (gastrointestinal bleeding) [K92.2]  Ischemic leg pain [M79.606, I99.8]  Procedure(s) (LRB):  ESOPHAGOGASTRODUODENOSCOPY (EGD) ROOM 707 (N/A)  ESOPHAGOGASTRODUODENAL (EGD) BIOPSY (N/A)  2 Days Post-Op    ASSESSMENT:     REHAB RECOMMENDATIONS: CURRENT LEVEL OF FUNCTION:  (Most Recently Demonstrated)   Recommendation to date pending progress:  Setting:   Short-term Rehab  Equipment:    3 in 1 Bedside Commode   Rolling Walker Bed Mobility:   Standby Assistance  Sit to Stand:   Minimal Assistance x 2  Transfers:   Not tested  Gait/Mobility:   Minimal Assistance x 2     ASSESSMENT:  Mr. Hung Galvan was supine on contact. He sat to EOB with SBA. All other mobility is with min a x 2. He has R foot drop causing unsteadiness. He amb with RW and min a x 2. Pt educated in sit to stand safety and climbing/descending steps safely which he was able to repeat several minutes later. Pt performed B LE ex. SUBJECTIVE:   Mr. Hung Galvan states \"I will be ok at home. I have hand rails. \"    SOCIAL HISTORY/ LIVING ENVIRONMENT:  Lives alone. Independent. Drives fork lift. Mobile home   with 3 steps to enter.    Home Environment: Trailer/mobile home  # Steps to Enter: 3  One/Two Story Residence: One story  Living Alone: Yes  Support Systems: Other Family Member(s)  OBJECTIVE:     PAIN: VITAL SIGNS: LINES/DRAINS:   Pre Treatment:  0  Post Treatment: 0   .  O2 Device: None (Room air)     MOBILITY: I Mod I S SBA CGA Min Mod Max Total  NT x2 Comments:   Bed Mobility    Rolling [] [] [] [x] [] [] [] [] [] [] []    Supine to Sit [] [] [] [x] [] [] [] [] [] [] []    Scooting [] [] [] [x] [] [] [] [] [] [] []    Sit to Supine [] [] [] [] [] [] [] [] [] [] []    Transfers    Sit to Stand [] [] [] [] [] [x] [] [] [] [] [x]    Bed to Chair [] [] [] [] [] [] [] [] [] [x] []    Stand to Sit [] [] [] [] [] [x] [] [] [] [] [x]    I=Independent, Mod I=Modified Independent, S=Supervision, SBA=Standby Assistance, CGA=Contact Guard Assistance,   Min=Minimal Assistance, Mod=Moderate Assistance, Max=Maximal Assistance, Total=Total Assistance, NT=Not Tested    BALANCE: Good Fair+ Fair Fair- Poor NT Comments   Sitting Static [] [x] [] [] [] []    Sitting Dynamic [] [x] [] [] [] []              Standing Static [] [] [] [x] [] []    Standing Dynamic [] [] [] [x] [] []      GAIT: I Mod I S SBA CGA Min Mod Max Total  NT x2 Comments:   Level of Assistance [] [] [] [] [] [x] [] [] [] [] [x]    Distance 30'    DME Rolling Walker    Gait Quality R foot drop    Weightbearing  Status N/A     I=Independent, Mod I=Modified Independent, S=Supervision, SBA=Standby Assistance, CGA=Contact Guard Assistance,   Min=Minimal Assistance, Mod=Moderate Assistance, Max=Maximal Assistance, Total=Total Assistance, NT=Not Tested    PLAN:   FREQUENCY/DURATION: PT Plan of Care: 3 times/week for duration of hospital stay or until stated goals are met, whichever comes first.  TREATMENT:     TREATMENT:   ($$ Therapeutic Activity: 23-37 mins    )  Therapeutic Activity (23 Minutes): Therapeutic activity included Supine to Sit, Scooting, Ambulation on level ground, Sitting balance , Standing balance and LE ex to improve functional Mobility, Strength and Activity tolerance.     TREATMENT GRID:  N/A    AFTER TREATMENT POSITION/PRECAUTIONS:  Alarm Activated, Chair, Needs within reach and RN notified    INTERDISCIPLINARY COLLABORATION:  RN/PCT, PT/PTA and OT/DURÁN    TOTAL TREATMENT DURATION:  PT Patient Time In/Time Out  Time In: 0338  Time Out: 81 [facility], [facility]

## 2021-11-03 NOTE — PROGRESS NOTES
Patient seen and examined. No acute issues. Patient scheduled to see vascular surgeon outside hospital for potential aortobifem with supraceliac clamp. Discussed with patient the risk of continued with tobacco and alcohol abuse can do for us his peripheral vascular disease. I believe his occlusion is acute on chronic. Okay to be discharged to rehab follow vascular surgeon next week. Clinically he has weakly palpable femoral pulses nonpalpable pedal pulses. Both his thighs and legs are warm. His right foot is cool he has motor and sensory intact.     Stefany Pascual

## 2021-11-03 NOTE — PROGRESS NOTES
ACUTE OT GOALS:  (Developed with and agreed upon by patient and/or caregiver.)  1. Patient will complete lower body bathing and dressing with MOD I  and adaptive equipment as needed. 2.Patient will complete upper body bathing and dressing with MOD I and adaptive equipment as needed. 3. Patient will complete toileting with SUPERVISION. 4. Patient will tolerate 25 minutes of OT treatment with 1-2 rest breaks to increase activity tolerance for ADLs. 5. Patient will complete functional transfers with SUPERVISION and adaptive equipment as needed. 6. Patient will complete functional activity with MOD I and adaptive equipment as needed.     Timeframe: 7 visits     OCCUPATIONAL THERAPY ASSESSMENT: Initial Assessment and Daily Note OT Treatment Day # 1    Sarkis Dodd is a 62 y.o. male   PRIMARY DIAGNOSIS: GIB (gastrointestinal bleeding)  GIB (gastrointestinal bleeding) [K92.2]  Ischemic leg pain [M79.606, I99.8]  Procedure(s) (LRB):  ESOPHAGOGASTRODUODENOSCOPY (EGD) ROOM 707 (N/A)  ESOPHAGOGASTRODUODENAL (EGD) BIOPSY (N/A)  2 Days Post-Op  Reason for Referral:    ICD-10: Treatment Diagnosis: Generalized Muscle Weakness (M62.81)  Other lack of cordination (R27.8)  Difficulty in walking, Not elsewhere classified (R26.2)  INPATIENT: Payor: BLUE CROSS / Plan: [facility] / Product Type: PPO /   ASSESSMENT:     REHAB RECOMMENDATIONS:   Recommendation to date pending progress:  Setting:   Short-term Rehab  Equipment:    3 in 1 Bedside Commode   Rolling Walker     PRIOR LEVEL OF FUNCTION:  (Prior to Hospitalization)  INITIAL/CURRENT LEVEL OF FUNCTION:  (Based on today's evaluation)   Bathing:   Independent  Dressing:   Independent  Feeding/Grooming:   Independent  Toileting:   Independent  Functional Mobility:   Independent Bathing:   Contact Guard Assistance in sitting  Dressing:   Contact Guard Assistance  Feeding/Grooming:   Minimal Assistance  Toileting:   Minimal Assistance  Functional Mobility:   Minimal Assistance x 2     ASSESSMENT:  Mr. Marci Pedraza presented to the hospital with GIB and ischemia of RLE. At baseline, pt lives alone and is independent for ADLs/IADLs. Today, pt was received supine in bed. CGA for LB dressing long-sitting in the bed. CGA for bed mobility. Sit>stand and ambulation with RW [place] x2 with significant R foot drop. Pt reported this is new. Grooming task standing at the sink [place] for standing balance. Pt demonstrated decreased safety awareness and insight into current functional deficits. Discussed concerns regarding pt returning home at current functional level--pt continues to wish to return home. Heather Lazo currently demonstrates overall deficits in strength, balance, activity tolerance, and ADL performance. Patient would benefit from skilled OT services at this time in order to address functional deficits and OT goals stated above. SUBJECTIVE:   Mr. Marci Pedraza states, \"I'll be fine. \"    SOCIAL HISTORY/LIVING ENVIRONMENT: lives alone in a mobile home with 3 steps to enter, tub shower, no DME, independent for ADLs and IADLs, works full-time, states he will have assistance from family at d/c  Home Environment: Trailer/mobile home  # Steps to Enter: 3  One/Two Story Residence: One story  Living Alone: Yes  Support Systems: Friend/Neighbor, Other Family Member(s)    OBJECTIVE:     PAIN: VITAL SIGNS: LINES/DRAINS:   Pre Treatment: Pain Screen  Pain Scale 1: Numeric (0 - 10)  Pain Intensity 1: 0  Post Treatment: no change    none  O2 Device: None (Room air)     GROSS EVALUATION:  BUEs Within Functional Limits Abnormal/ Functional Abnormal/ Non-Functional (see comments) Not Tested Comments:   AROM [x] [] [] []    PROM [] [] [] [x]    Strength [] [x] [] [] Generalized weakness    Balance [] [x] [] [] Fair+ sitting, fair standing    Posture [x] [] [] []    Sensation [x] [] [] []    Coordination [] [x] [] []    Tone [] [] [] [x]    Edema [] [] [] [x] Activity Tolerance [] [x] [] []     [] [] [] []      COGNITION/  PERCEPTION: Intact Impaired   (see comments) Comments:   Orientation [x] []    Vision [x] []    Hearing [x] []    Judgment/ Insight [] [x] Decreased safety awareness and insight into current functional deficits    Attention [x] []    Memory [x] []    Command Following [x] []    Emotional Regulation [x] []     [] []      ACTIVITIES OF DAILY LIVING: I Mod I S SBA CGA Min Mod Max Total NT Comments   BASIC ADLs:              Bathing/ Showering [] [] [] [] [] [] [] [] [] [x]    Toileting [] [] [] [] [] [] [] [] [] [x]    Dressing [] [] [] [] [x] [] [] [] [] [] Donned socks long-sitting in bed   Feeding [] [] [] [] [] [] [] [] [] [x]    Grooming [] [] [] [] [] [x] [] [] [] [] Washed hands standing at the sink   Personal Device Care [] [] [] [] [] [] [] [] [] [x]    Functional Mobility [] [] [] [] [] [x] [] [] [] [] x2 with RW, R foot drop   I=Independent, Mod I=Modified Independent, S=Supervision, SBA=Standby Assistance, CGA=Contact Guard Assistance,   Min=Minimal Assistance, Mod=Moderate Assistance, Max=Maximal Assistance, Total=Total Assistance, NT=Not Tested    MOBILITY: I Mod I S SBA CGA Min Mod Max Total  NT x2 Comments:   Supine to sit [] [] [] [] [x] [] [] [] [] [] []    Sit to supine [] [] [] [] [] [] [] [] [] [x] [] Left sitting in the chair    Sit to stand [] [] [] [] [] [x] [] [] [] [] []    Bed to chair [] [] [] [] [] [x] [] [] [] [] [x] RW   I=Independent, Mod I=Modified Independent, S=Supervision, SBA=Standby Assistance, CGA=Contact Guard Assistance,   Min=Minimal Assistance, Mod=Moderate Assistance, Max=Maximal Assistance, Total=Total Assistance, NT=Not Tested    [facility] [address] [id number] AM-PAC 6 Clicks   Daily Activity Inpatient Short Form        How much help from another person does the patient currently need. .. Total A Lot A Little None   1. Putting on and taking off regular lower body clothing? [] 1   [] 2   [x] 3   [] 4   2.   Bathing (including washing, rinsing, drying)? [] 1   [] 2   [x] 3   [] 4   3. Toileting, which includes using toilet, bedpan or urinal?   [] 1   [] 2   [x] 3   [] 4   4. Putting on and taking off regular upper body clothing? [] 1   [] 2   [x] 3   [] 4   5. Taking care of personal grooming such as brushing teeth? [] 1   [] 2   [x] 3   [] 4   6. Eating meals? [] 1   [] 2   [] 3   [x] 4   © 2007, Trustees of [facility] [employer], under license to [employer]. All rights reserved     Score:  Initial: 19 completed on 11/3/21 Most Recent: X (Date: -- )   Interpretation of Tool:  Represents activities that are increasingly more difficult (i.e. Bed mobility, Transfers, Gait). PLAN:   FREQUENCY/DURATION: OT Plan of Care: 3 times/week for duration of hospital stay or until stated goals are met, whichever comes first.    PROBLEM LIST:   (Skilled intervention is medically necessary to address:)  1. Decreased ADL/Functional Activities  2. Decreased Activity Tolerance  3. Decreased Balance  4. Decreased Cognition  5. Decreased Strength  6. Decreased Transfer Abilities   INTERVENTIONS PLANNED:   (Benefits and precautions of occupational therapy have been discussed with the patient.)  1. Self Care Training  2. Therapeutic Activity  3. Therapeutic Exercise/HEP  4. Neuromuscular Re-education  5. Education     TREATMENT:     EVALUATION: Low Complexity : (Untimed Charge)    TREATMENT:   ($$ Self Care/Home Management: 8-22 mins    )  Co-Treatment PT/OT necessary due to patient's decreased overall endurance/tolerance levels, as well as need for high level skilled assistance to complete functional transfers/mobility and functional tasks  Self Care (10 Minutes): Self care including Lower Body Dressing and Grooming to increase independence and decrease level of assistance required.     TREATMENT GRID:  N/A    AFTER TREATMENT POSITION/PRECAUTIONS:  Alarm Activated, Chair, Needs within reach and RN notified    INTERDISCIPLINARY COLLABORATION:  RN/PCT, PT/PTA and OT/DURÁN    TOTAL TREATMENT DURATION:  OT Patient Time In/Time Out  Time In: 0851  Time Out: 947 Tiana Maciel OT

## 2021-11-05 ENCOUNTER — HOME CARE VISIT (OUTPATIENT)
Dept: SCHEDULING | Facility: HOME HEALTH | Age: 61
End: 2021-11-05
Payer: COMMERCIAL

## 2021-11-05 VITALS
DIASTOLIC BLOOD PRESSURE: 82 MMHG | SYSTOLIC BLOOD PRESSURE: 147 MMHG | TEMPERATURE: 97.9 F | RESPIRATION RATE: 15 BRPM | OXYGEN SATURATION: 94 % | HEART RATE: 80 BPM

## 2021-11-05 PROCEDURE — G0493 RN CARE EA 15 MIN HH/HOSPICE: HCPCS

## 2021-11-05 PROCEDURE — 400013 HH SOC

## 2021-11-05 NOTE — PROGRESS NOTES
Physician Progress Note      PATIENT:               Anna Summers  CSN #:                  [id number]  :                       1960  ADMIT DATE:       10/31/2021 6:12 AM  DISCH DATE:        11/3/2021 1:10 PM  RESPONDING  PROVIDER #:        Jaiden NOLASCO DO          QUERY TEXT:    Pt admitted with Hematemesis and right leg pain, noted with Alcohol Abuse and has acute encephalopathy documented. If possible, please document in progress notes and discharge summary further specificity regarding the type of encephalopathy:    The medical record reflects the following:  Risk Factors: Alcohol Abuse  Clinical Indicators: Per H&P-\" Patient somewhat confused on my evaluation and only able to answer some of my questions. .... Patient does admit to drinking alcohol, \"many bottles\" on daily basis. \" 10/31- WBC 11.9, Ammonia Level 43. - WBC 16.8. Treatment: Ammonia Level, Lactulose, Ativan, Banana Bag  Options provided:  -- Alcoholic encephalopathy  -- Acute hepatic encephalopathy without coma  -- Metabolic encephalopathy  -- Other - I will add my own diagnosis  -- Disagree - Not applicable / Not valid  -- Disagree - Clinically unable to determine / Unknown  -- Refer to Clinical Documentation Reviewer    PROVIDER RESPONSE TEXT:    Provider is clinically unable to determine a response to this query.     Query created by: briana anderson on 2021 11:00 AM      Electronically signed by:  Terry Rachel DO 2021 7:45 AM

## 2021-11-11 ENCOUNTER — HOME CARE VISIT (OUTPATIENT)
Dept: SCHEDULING | Facility: HOME HEALTH | Age: 61
End: 2021-11-11
Payer: COMMERCIAL

## 2021-11-11 VITALS — SYSTOLIC BLOOD PRESSURE: 132 MMHG | DIASTOLIC BLOOD PRESSURE: 68 MMHG | HEART RATE: 80 BPM | TEMPERATURE: 98.7 F

## 2021-11-11 PROCEDURE — G0299 HHS/HOSPICE OF RN EA 15 MIN: HCPCS

## 2021-11-12 ENCOUNTER — HOME CARE VISIT (OUTPATIENT)
Dept: HOME HEALTH SERVICES | Facility: HOME HEALTH | Age: 61
End: 2021-11-12
Payer: COMMERCIAL

## 2021-11-16 ENCOUNTER — HOME CARE VISIT (OUTPATIENT)
Dept: SCHEDULING | Facility: HOME HEALTH | Age: 61
End: 2021-11-16
Payer: COMMERCIAL

## 2021-11-18 ENCOUNTER — HOME CARE VISIT (OUTPATIENT)
Dept: SCHEDULING | Facility: HOME HEALTH | Age: 61
End: 2021-11-18
Payer: COMMERCIAL

## 2021-11-25 ENCOUNTER — HOME CARE VISIT (OUTPATIENT)
Dept: SCHEDULING | Facility: HOME HEALTH | Age: 61
End: 2021-11-25
Payer: COMMERCIAL

## 2021-11-25 VITALS
DIASTOLIC BLOOD PRESSURE: 68 MMHG | RESPIRATION RATE: 18 BRPM | SYSTOLIC BLOOD PRESSURE: 122 MMHG | TEMPERATURE: 97.8 F | HEART RATE: 81 BPM | OXYGEN SATURATION: 98 %

## 2021-11-25 PROCEDURE — A6212 FOAM DRG <=16 SQ IN W/BORDER: HCPCS

## 2021-11-25 PROCEDURE — A9270 NON-COVERED ITEM OR SERVICE: HCPCS

## 2021-11-25 PROCEDURE — A6216 NON-STERILE GAUZE<=16 SQ IN: HCPCS

## 2021-11-25 PROCEDURE — G0299 HHS/HOSPICE OF RN EA 15 MIN: HCPCS

## 2021-11-25 PROCEDURE — A6257 TRANSPARENT FILM <= 16 SQ IN: HCPCS

## 2021-11-25 PROCEDURE — A6446 CONFORM BAND S W>=3" <5"/YD: HCPCS

## 2021-11-27 ENCOUNTER — HOME CARE VISIT (OUTPATIENT)
Dept: SCHEDULING | Facility: HOME HEALTH | Age: 61
End: 2021-11-27
Payer: COMMERCIAL

## 2021-11-27 VITALS
HEART RATE: 79 BPM | RESPIRATION RATE: 16 BRPM | SYSTOLIC BLOOD PRESSURE: 100 MMHG | OXYGEN SATURATION: 91 % | DIASTOLIC BLOOD PRESSURE: 54 MMHG | TEMPERATURE: 97.4 F

## 2021-11-27 PROCEDURE — G0299 HHS/HOSPICE OF RN EA 15 MIN: HCPCS

## 2021-11-29 ENCOUNTER — HOME CARE VISIT (OUTPATIENT)
Dept: SCHEDULING | Facility: HOME HEALTH | Age: 61
End: 2021-11-29
Payer: COMMERCIAL

## 2021-11-29 VITALS
TEMPERATURE: 99 F | DIASTOLIC BLOOD PRESSURE: 62 MMHG | RESPIRATION RATE: 18 BRPM | SYSTOLIC BLOOD PRESSURE: 126 MMHG | OXYGEN SATURATION: 93 % | HEART RATE: 74 BPM

## 2021-11-29 PROCEDURE — G0299 HHS/HOSPICE OF RN EA 15 MIN: HCPCS

## 2021-11-29 PROCEDURE — G0151 HHCP-SERV OF PT,EA 15 MIN: HCPCS

## 2021-11-30 ENCOUNTER — HOME CARE VISIT (OUTPATIENT)
Dept: SCHEDULING | Facility: HOME HEALTH | Age: 61
End: 2021-11-30
Payer: COMMERCIAL

## 2021-11-30 VITALS
SYSTOLIC BLOOD PRESSURE: 80 MMHG | RESPIRATION RATE: 18 BRPM | HEART RATE: 57 BPM | OXYGEN SATURATION: 82 % | DIASTOLIC BLOOD PRESSURE: 60 MMHG | TEMPERATURE: 98.7 F

## 2021-11-30 PROCEDURE — G0152 HHCP-SERV OF OT,EA 15 MIN: HCPCS

## 2021-12-01 ENCOUNTER — HOME CARE VISIT (OUTPATIENT)
Dept: SCHEDULING | Facility: HOME HEALTH | Age: 61
End: 2021-12-01
Payer: COMMERCIAL

## 2021-12-01 VITALS
OXYGEN SATURATION: 98 % | RESPIRATION RATE: 17 BRPM | DIASTOLIC BLOOD PRESSURE: 74 MMHG | HEART RATE: 84 BPM | TEMPERATURE: 98 F | SYSTOLIC BLOOD PRESSURE: 128 MMHG

## 2021-12-01 VITALS
OXYGEN SATURATION: 97 % | RESPIRATION RATE: 19 BRPM | HEART RATE: 78 BPM | SYSTOLIC BLOOD PRESSURE: 116 MMHG | TEMPERATURE: 98.1 F | DIASTOLIC BLOOD PRESSURE: 80 MMHG

## 2021-12-01 PROCEDURE — MED11779

## 2021-12-01 PROCEDURE — A6196 ALGINATE DRESSING <=16 SQ IN: HCPCS

## 2021-12-01 PROCEDURE — G0299 HHS/HOSPICE OF RN EA 15 MIN: HCPCS

## 2021-12-03 ENCOUNTER — HOME CARE VISIT (OUTPATIENT)
Dept: SCHEDULING | Facility: HOME HEALTH | Age: 61
End: 2021-12-03
Payer: COMMERCIAL

## 2021-12-03 VITALS
OXYGEN SATURATION: 97 % | DIASTOLIC BLOOD PRESSURE: 60 MMHG | RESPIRATION RATE: 16 BRPM | SYSTOLIC BLOOD PRESSURE: 102 MMHG | HEART RATE: 72 BPM | TEMPERATURE: 98 F

## 2021-12-03 PROCEDURE — G0299 HHS/HOSPICE OF RN EA 15 MIN: HCPCS

## 2021-12-03 PROCEDURE — G0157 HHC PT ASSISTANT EA 15: HCPCS

## 2021-12-05 VITALS
SYSTOLIC BLOOD PRESSURE: 102 MMHG | OXYGEN SATURATION: 98 % | HEART RATE: 88 BPM | DIASTOLIC BLOOD PRESSURE: 60 MMHG | RESPIRATION RATE: 16 BRPM | TEMPERATURE: 97 F

## 2021-12-06 ENCOUNTER — HOME CARE VISIT (OUTPATIENT)
Dept: SCHEDULING | Facility: HOME HEALTH | Age: 61
End: 2021-12-06
Payer: COMMERCIAL

## 2021-12-06 VITALS
TEMPERATURE: 98.5 F | HEART RATE: 70 BPM | DIASTOLIC BLOOD PRESSURE: 78 MMHG | RESPIRATION RATE: 18 BRPM | OXYGEN SATURATION: 97 % | SYSTOLIC BLOOD PRESSURE: 114 MMHG

## 2021-12-06 PROCEDURE — G0299 HHS/HOSPICE OF RN EA 15 MIN: HCPCS

## 2021-12-06 PROCEDURE — 400013 HH SOC

## 2021-12-07 ENCOUNTER — HOME CARE VISIT (OUTPATIENT)
Dept: SCHEDULING | Facility: HOME HEALTH | Age: 61
End: 2021-12-07
Payer: COMMERCIAL

## 2021-12-07 VITALS
DIASTOLIC BLOOD PRESSURE: 67 MMHG | OXYGEN SATURATION: 98 % | SYSTOLIC BLOOD PRESSURE: 100 MMHG | RESPIRATION RATE: 16 BRPM | HEART RATE: 61 BPM | TEMPERATURE: 97.7 F

## 2021-12-07 VITALS
OXYGEN SATURATION: 94 % | RESPIRATION RATE: 18 BRPM | HEART RATE: 78 BPM | SYSTOLIC BLOOD PRESSURE: 110 MMHG | DIASTOLIC BLOOD PRESSURE: 60 MMHG | TEMPERATURE: 97.5 F

## 2021-12-07 PROCEDURE — G0157 HHC PT ASSISTANT EA 15: HCPCS

## 2021-12-07 PROCEDURE — G0158 HHC OT ASSISTANT EA 15: HCPCS

## 2021-12-08 ENCOUNTER — HOME CARE VISIT (OUTPATIENT)
Dept: SCHEDULING | Facility: HOME HEALTH | Age: 61
End: 2021-12-08
Payer: COMMERCIAL

## 2021-12-08 PROCEDURE — G0299 HHS/HOSPICE OF RN EA 15 MIN: HCPCS

## 2021-12-08 PROCEDURE — G0151 HHCP-SERV OF PT,EA 15 MIN: HCPCS

## 2021-12-09 ENCOUNTER — HOME CARE VISIT (OUTPATIENT)
Dept: SCHEDULING | Facility: HOME HEALTH | Age: 61
End: 2021-12-09
Payer: COMMERCIAL

## 2021-12-09 VITALS
SYSTOLIC BLOOD PRESSURE: 101 MMHG | TEMPERATURE: 97.6 F | OXYGEN SATURATION: 97 % | DIASTOLIC BLOOD PRESSURE: 65 MMHG | HEART RATE: 60 BPM | RESPIRATION RATE: 16 BRPM

## 2021-12-09 VITALS
OXYGEN SATURATION: 97 % | HEART RATE: 65 BPM | TEMPERATURE: 98.9 F | DIASTOLIC BLOOD PRESSURE: 62 MMHG | RESPIRATION RATE: 16 BRPM | SYSTOLIC BLOOD PRESSURE: 104 MMHG

## 2021-12-09 PROCEDURE — G0158 HHC OT ASSISTANT EA 15: HCPCS

## 2021-12-10 ENCOUNTER — HOME CARE VISIT (OUTPATIENT)
Dept: SCHEDULING | Facility: HOME HEALTH | Age: 61
End: 2021-12-10
Payer: COMMERCIAL

## 2021-12-10 VITALS
DIASTOLIC BLOOD PRESSURE: 64 MMHG | RESPIRATION RATE: 18 BRPM | SYSTOLIC BLOOD PRESSURE: 104 MMHG | TEMPERATURE: 98 F | OXYGEN SATURATION: 98 % | HEART RATE: 77 BPM

## 2021-12-10 VITALS
SYSTOLIC BLOOD PRESSURE: 108 MMHG | RESPIRATION RATE: 18 BRPM | OXYGEN SATURATION: 96 % | TEMPERATURE: 97.4 F | DIASTOLIC BLOOD PRESSURE: 78 MMHG | HEART RATE: 94 BPM

## 2021-12-10 PROCEDURE — G0299 HHS/HOSPICE OF RN EA 15 MIN: HCPCS

## 2021-12-13 ENCOUNTER — HOME CARE VISIT (OUTPATIENT)
Dept: SCHEDULING | Facility: HOME HEALTH | Age: 61
End: 2021-12-13
Payer: COMMERCIAL

## 2021-12-13 VITALS
SYSTOLIC BLOOD PRESSURE: 110 MMHG | RESPIRATION RATE: 18 BRPM | TEMPERATURE: 98.2 F | OXYGEN SATURATION: 93 % | HEART RATE: 80 BPM | DIASTOLIC BLOOD PRESSURE: 60 MMHG

## 2021-12-13 PROCEDURE — G0299 HHS/HOSPICE OF RN EA 15 MIN: HCPCS

## 2021-12-14 ENCOUNTER — HOME CARE VISIT (OUTPATIENT)
Dept: SCHEDULING | Facility: HOME HEALTH | Age: 61
End: 2021-12-14
Payer: COMMERCIAL

## 2021-12-14 VITALS
HEART RATE: 70 BPM | SYSTOLIC BLOOD PRESSURE: 105 MMHG | DIASTOLIC BLOOD PRESSURE: 65 MMHG | OXYGEN SATURATION: 97 % | TEMPERATURE: 97.7 F | RESPIRATION RATE: 17 BRPM

## 2021-12-14 PROCEDURE — G0158 HHC OT ASSISTANT EA 15: HCPCS

## 2021-12-15 ENCOUNTER — HOME CARE VISIT (OUTPATIENT)
Dept: SCHEDULING | Facility: HOME HEALTH | Age: 61
End: 2021-12-15
Payer: COMMERCIAL

## 2021-12-15 ENCOUNTER — HOME CARE VISIT (OUTPATIENT)
Dept: HOME HEALTH SERVICES | Facility: HOME HEALTH | Age: 61
End: 2021-12-15
Payer: COMMERCIAL

## 2021-12-15 PROCEDURE — G0157 HHC PT ASSISTANT EA 15: HCPCS

## 2021-12-15 PROCEDURE — G0299 HHS/HOSPICE OF RN EA 15 MIN: HCPCS

## 2021-12-16 ENCOUNTER — HOME CARE VISIT (OUTPATIENT)
Dept: SCHEDULING | Facility: HOME HEALTH | Age: 61
End: 2021-12-16
Payer: COMMERCIAL

## 2021-12-16 VITALS
TEMPERATURE: 99.5 F | DIASTOLIC BLOOD PRESSURE: 75 MMHG | OXYGEN SATURATION: 97 % | SYSTOLIC BLOOD PRESSURE: 124 MMHG | RESPIRATION RATE: 18 BRPM | HEART RATE: 73 BPM

## 2021-12-16 VITALS
HEART RATE: 64 BPM | SYSTOLIC BLOOD PRESSURE: 110 MMHG | OXYGEN SATURATION: 98 % | RESPIRATION RATE: 16 BRPM | DIASTOLIC BLOOD PRESSURE: 71 MMHG | TEMPERATURE: 97.7 F

## 2021-12-16 VITALS
HEART RATE: 69 BPM | OXYGEN SATURATION: 97 % | TEMPERATURE: 97.5 F | RESPIRATION RATE: 18 BRPM | SYSTOLIC BLOOD PRESSURE: 120 MMHG | DIASTOLIC BLOOD PRESSURE: 62 MMHG

## 2021-12-16 PROCEDURE — G0158 HHC OT ASSISTANT EA 15: HCPCS

## 2021-12-17 ENCOUNTER — HOME CARE VISIT (OUTPATIENT)
Dept: SCHEDULING | Facility: HOME HEALTH | Age: 61
End: 2021-12-17
Payer: COMMERCIAL

## 2021-12-17 PROCEDURE — G0151 HHCP-SERV OF PT,EA 15 MIN: HCPCS

## 2021-12-17 PROCEDURE — G0299 HHS/HOSPICE OF RN EA 15 MIN: HCPCS

## 2021-12-19 VITALS
DIASTOLIC BLOOD PRESSURE: 86 MMHG | TEMPERATURE: 98.7 F | OXYGEN SATURATION: 87 % | SYSTOLIC BLOOD PRESSURE: 130 MMHG | RESPIRATION RATE: 16 BRPM | HEART RATE: 65 BPM

## 2021-12-20 ENCOUNTER — HOME CARE VISIT (OUTPATIENT)
Dept: SCHEDULING | Facility: HOME HEALTH | Age: 61
End: 2021-12-20
Payer: COMMERCIAL

## 2021-12-20 VITALS
SYSTOLIC BLOOD PRESSURE: 110 MMHG | HEART RATE: 78 BPM | RESPIRATION RATE: 16 BRPM | TEMPERATURE: 99 F | DIASTOLIC BLOOD PRESSURE: 64 MMHG | OXYGEN SATURATION: 92 %

## 2021-12-20 VITALS
TEMPERATURE: 97 F | OXYGEN SATURATION: 98 % | SYSTOLIC BLOOD PRESSURE: 126 MMHG | RESPIRATION RATE: 18 BRPM | HEART RATE: 78 BPM | DIASTOLIC BLOOD PRESSURE: 74 MMHG

## 2021-12-20 PROCEDURE — A6216 NON-STERILE GAUZE<=16 SQ IN: HCPCS

## 2021-12-20 PROCEDURE — A6446 CONFORM BAND S W>=3" <5"/YD: HCPCS

## 2021-12-20 PROCEDURE — G0299 HHS/HOSPICE OF RN EA 15 MIN: HCPCS

## 2021-12-21 ENCOUNTER — HOME CARE VISIT (OUTPATIENT)
Dept: SCHEDULING | Facility: HOME HEALTH | Age: 61
End: 2021-12-21
Payer: COMMERCIAL

## 2021-12-21 VITALS
DIASTOLIC BLOOD PRESSURE: 67 MMHG | SYSTOLIC BLOOD PRESSURE: 110 MMHG | HEART RATE: 75 BPM | TEMPERATURE: 97.7 F | OXYGEN SATURATION: 97 % | RESPIRATION RATE: 16 BRPM

## 2021-12-21 PROCEDURE — G0158 HHC OT ASSISTANT EA 15: HCPCS

## 2021-12-22 ENCOUNTER — HOME CARE VISIT (OUTPATIENT)
Dept: SCHEDULING | Facility: HOME HEALTH | Age: 61
End: 2021-12-22
Payer: COMMERCIAL

## 2021-12-22 PROCEDURE — G0299 HHS/HOSPICE OF RN EA 15 MIN: HCPCS

## 2021-12-23 ENCOUNTER — HOME CARE VISIT (OUTPATIENT)
Dept: SCHEDULING | Facility: HOME HEALTH | Age: 61
End: 2021-12-23
Payer: COMMERCIAL

## 2021-12-23 VITALS
RESPIRATION RATE: 19 BRPM | DIASTOLIC BLOOD PRESSURE: 60 MMHG | SYSTOLIC BLOOD PRESSURE: 100 MMHG | HEART RATE: 60 BPM | TEMPERATURE: 98.2 F | OXYGEN SATURATION: 90 %

## 2021-12-23 PROCEDURE — G0157 HHC PT ASSISTANT EA 15: HCPCS

## 2021-12-23 PROCEDURE — G0152 HHCP-SERV OF OT,EA 15 MIN: HCPCS

## 2021-12-24 ENCOUNTER — HOME CARE VISIT (OUTPATIENT)
Dept: SCHEDULING | Facility: HOME HEALTH | Age: 61
End: 2021-12-24
Payer: COMMERCIAL

## 2021-12-24 VITALS
RESPIRATION RATE: 17 BRPM | OXYGEN SATURATION: 97 % | DIASTOLIC BLOOD PRESSURE: 70 MMHG | SYSTOLIC BLOOD PRESSURE: 122 MMHG | RESPIRATION RATE: 17 BRPM | SYSTOLIC BLOOD PRESSURE: 120 MMHG | DIASTOLIC BLOOD PRESSURE: 78 MMHG | HEART RATE: 78 BPM | TEMPERATURE: 98.4 F | OXYGEN SATURATION: 99 % | HEART RATE: 70 BPM | TEMPERATURE: 98.1 F

## 2021-12-24 VITALS
DIASTOLIC BLOOD PRESSURE: 78 MMHG | OXYGEN SATURATION: 98 % | TEMPERATURE: 96.9 F | HEART RATE: 88 BPM | SYSTOLIC BLOOD PRESSURE: 118 MMHG | RESPIRATION RATE: 17 BRPM

## 2021-12-24 PROCEDURE — G0299 HHS/HOSPICE OF RN EA 15 MIN: HCPCS

## 2021-12-27 ENCOUNTER — HOME CARE VISIT (OUTPATIENT)
Dept: SCHEDULING | Facility: HOME HEALTH | Age: 61
End: 2021-12-27
Payer: COMMERCIAL

## 2021-12-27 PROCEDURE — G0299 HHS/HOSPICE OF RN EA 15 MIN: HCPCS

## 2021-12-28 VITALS
RESPIRATION RATE: 18 BRPM | SYSTOLIC BLOOD PRESSURE: 118 MMHG | DIASTOLIC BLOOD PRESSURE: 74 MMHG | OXYGEN SATURATION: 96 % | HEART RATE: 78 BPM | TEMPERATURE: 98.5 F

## 2021-12-29 ENCOUNTER — HOME CARE VISIT (OUTPATIENT)
Dept: SCHEDULING | Facility: HOME HEALTH | Age: 61
End: 2021-12-29
Payer: COMMERCIAL

## 2021-12-29 VITALS
SYSTOLIC BLOOD PRESSURE: 128 MMHG | RESPIRATION RATE: 16 BRPM | TEMPERATURE: 98.5 F | HEART RATE: 72 BPM | OXYGEN SATURATION: 97 % | DIASTOLIC BLOOD PRESSURE: 74 MMHG

## 2021-12-29 PROCEDURE — A4452 WATERPROOF TAPE: HCPCS

## 2021-12-29 PROCEDURE — A6196 ALGINATE DRESSING <=16 SQ IN: HCPCS

## 2021-12-29 PROCEDURE — G0299 HHS/HOSPICE OF RN EA 15 MIN: HCPCS

## 2021-12-30 ENCOUNTER — HOME CARE VISIT (OUTPATIENT)
Dept: SCHEDULING | Facility: HOME HEALTH | Age: 61
End: 2021-12-30
Payer: COMMERCIAL

## 2021-12-30 VITALS
SYSTOLIC BLOOD PRESSURE: 124 MMHG | TEMPERATURE: 97.1 F | HEART RATE: 78 BPM | OXYGEN SATURATION: 97 % | DIASTOLIC BLOOD PRESSURE: 68 MMHG | RESPIRATION RATE: 17 BRPM

## 2021-12-30 PROCEDURE — G0151 HHCP-SERV OF PT,EA 15 MIN: HCPCS

## 2021-12-31 ENCOUNTER — HOME CARE VISIT (OUTPATIENT)
Dept: SCHEDULING | Facility: HOME HEALTH | Age: 61
End: 2021-12-31
Payer: COMMERCIAL

## 2021-12-31 VITALS
TEMPERATURE: 98.8 F | OXYGEN SATURATION: 94 % | HEART RATE: 70 BPM | DIASTOLIC BLOOD PRESSURE: 64 MMHG | SYSTOLIC BLOOD PRESSURE: 110 MMHG | RESPIRATION RATE: 18 BRPM

## 2021-12-31 PROCEDURE — G0299 HHS/HOSPICE OF RN EA 15 MIN: HCPCS

## 2022-01-03 ENCOUNTER — HOME CARE VISIT (OUTPATIENT)
Dept: SCHEDULING | Facility: HOME HEALTH | Age: 62
End: 2022-01-03
Payer: COMMERCIAL

## 2022-01-03 VITALS
SYSTOLIC BLOOD PRESSURE: 142 MMHG | HEART RATE: 64 BPM | DIASTOLIC BLOOD PRESSURE: 76 MMHG | OXYGEN SATURATION: 94 % | TEMPERATURE: 99.2 F | RESPIRATION RATE: 17 BRPM

## 2022-01-03 PROCEDURE — G0299 HHS/HOSPICE OF RN EA 15 MIN: HCPCS

## 2022-01-05 ENCOUNTER — HOME CARE VISIT (OUTPATIENT)
Dept: SCHEDULING | Facility: HOME HEALTH | Age: 62
End: 2022-01-05
Payer: COMMERCIAL

## 2022-01-05 PROCEDURE — G0299 HHS/HOSPICE OF RN EA 15 MIN: HCPCS

## 2022-01-05 PROCEDURE — 400014 HH F/U

## 2022-01-06 VITALS
RESPIRATION RATE: 18 BRPM | TEMPERATURE: 98.1 F | OXYGEN SATURATION: 97 % | DIASTOLIC BLOOD PRESSURE: 78 MMHG | SYSTOLIC BLOOD PRESSURE: 118 MMHG | HEART RATE: 78 BPM

## 2022-01-07 ENCOUNTER — HOME CARE VISIT (OUTPATIENT)
Dept: SCHEDULING | Facility: HOME HEALTH | Age: 62
End: 2022-01-07
Payer: COMMERCIAL

## 2022-01-07 PROCEDURE — G0299 HHS/HOSPICE OF RN EA 15 MIN: HCPCS

## 2022-01-10 ENCOUNTER — HOME CARE VISIT (OUTPATIENT)
Dept: SCHEDULING | Facility: HOME HEALTH | Age: 62
End: 2022-01-10
Payer: COMMERCIAL

## 2022-01-10 VITALS
SYSTOLIC BLOOD PRESSURE: 94 MMHG | RESPIRATION RATE: 12 BRPM | HEART RATE: 74 BPM | TEMPERATURE: 98.2 F | DIASTOLIC BLOOD PRESSURE: 62 MMHG

## 2022-01-10 VITALS
HEART RATE: 64 BPM | TEMPERATURE: 98.4 F | RESPIRATION RATE: 15 BRPM | OXYGEN SATURATION: 97 % | DIASTOLIC BLOOD PRESSURE: 64 MMHG | SYSTOLIC BLOOD PRESSURE: 122 MMHG

## 2022-01-10 PROCEDURE — G0299 HHS/HOSPICE OF RN EA 15 MIN: HCPCS

## 2022-01-11 PROCEDURE — A6252 ABSORPT DRG >16 <=48 W/O BDR: HCPCS

## 2022-01-11 PROCEDURE — A4452 WATERPROOF TAPE: HCPCS

## 2022-01-11 PROCEDURE — A6446 CONFORM BAND S W>=3" <5"/YD: HCPCS

## 2022-01-11 PROCEDURE — A6260 WOUND CLEANSER ANY TYPE/SIZE: HCPCS

## 2022-01-11 PROCEDURE — A4649 SURGICAL SUPPLIES: HCPCS

## 2022-01-11 PROCEDURE — A6198 ALGINATE DRESSING > 48 SQ IN: HCPCS

## 2022-01-11 PROCEDURE — A6216 NON-STERILE GAUZE<=16 SQ IN: HCPCS

## 2022-01-12 ENCOUNTER — HOME CARE VISIT (OUTPATIENT)
Dept: SCHEDULING | Facility: HOME HEALTH | Age: 62
End: 2022-01-12
Payer: COMMERCIAL

## 2022-01-12 VITALS
HEART RATE: 83 BPM | RESPIRATION RATE: 12 BRPM | TEMPERATURE: 97.5 F | DIASTOLIC BLOOD PRESSURE: 62 MMHG | SYSTOLIC BLOOD PRESSURE: 120 MMHG

## 2022-01-12 PROCEDURE — G0299 HHS/HOSPICE OF RN EA 15 MIN: HCPCS

## 2022-01-14 ENCOUNTER — HOME CARE VISIT (OUTPATIENT)
Dept: SCHEDULING | Facility: HOME HEALTH | Age: 62
End: 2022-01-14
Payer: COMMERCIAL

## 2022-01-14 PROCEDURE — G0299 HHS/HOSPICE OF RN EA 15 MIN: HCPCS

## 2022-01-16 ENCOUNTER — HOME CARE VISIT (OUTPATIENT)
Dept: HOME HEALTH SERVICES | Facility: HOME HEALTH | Age: 62
End: 2022-01-16
Payer: COMMERCIAL

## 2022-01-17 VITALS
DIASTOLIC BLOOD PRESSURE: 64 MMHG | RESPIRATION RATE: 18 BRPM | SYSTOLIC BLOOD PRESSURE: 106 MMHG | HEART RATE: 76 BPM | TEMPERATURE: 98.5 F | OXYGEN SATURATION: 98 %

## 2022-01-18 ENCOUNTER — HOME CARE VISIT (OUTPATIENT)
Dept: SCHEDULING | Facility: HOME HEALTH | Age: 62
End: 2022-01-18
Payer: COMMERCIAL

## 2022-01-18 VITALS — SYSTOLIC BLOOD PRESSURE: 84 MMHG | TEMPERATURE: 98.1 F | DIASTOLIC BLOOD PRESSURE: 58 MMHG

## 2022-01-18 PROCEDURE — G0299 HHS/HOSPICE OF RN EA 15 MIN: HCPCS

## 2022-01-20 ENCOUNTER — HOME CARE VISIT (OUTPATIENT)
Dept: SCHEDULING | Facility: HOME HEALTH | Age: 62
End: 2022-01-20
Payer: COMMERCIAL

## 2022-01-20 ENCOUNTER — HOSPITAL ENCOUNTER (OUTPATIENT)
Dept: LAB | Age: 62
Discharge: HOME | End: 2022-01-20
Payer: COMMERCIAL

## 2022-01-20 VITALS
TEMPERATURE: 98.3 F | RESPIRATION RATE: 18 BRPM | DIASTOLIC BLOOD PRESSURE: 64 MMHG | HEART RATE: 78 BPM | OXYGEN SATURATION: 97 % | SYSTOLIC BLOOD PRESSURE: 98 MMHG

## 2022-01-20 LAB
ERYTHROCYTE [DISTWIDTH] IN BLOOD BY AUTOMATED COUNT: 15.4 % (ref 11.9–14.6)
HCT VFR BLD AUTO: 38 % (ref 41.1–50.3)
HGB BLD-MCNC: 12.3 G/DL (ref 13.6–17.2)
MCH RBC QN AUTO: 28.8 PG (ref 26.1–32.9)
MCHC RBC AUTO-ENTMCNC: 32.4 G/DL (ref 31.4–35)
MCV RBC AUTO: 89 FL (ref 79.6–97.8)
NRBC # BLD: 0 K/UL (ref 0–0.2)
PLATELET # BLD AUTO: 209 K/UL (ref 150–450)
PMV BLD AUTO: 9.9 FL (ref 9.4–12.3)
RBC # BLD AUTO: 4.27 M/UL (ref 4.23–5.6)
WBC # BLD AUTO: 6.4 K/UL (ref 4.3–11.1)

## 2022-01-20 PROCEDURE — G0299 HHS/HOSPICE OF RN EA 15 MIN: HCPCS

## 2022-01-20 PROCEDURE — 85027 COMPLETE CBC AUTOMATED: CPT

## 2022-01-22 ENCOUNTER — HOME CARE VISIT (OUTPATIENT)
Dept: SCHEDULING | Facility: HOME HEALTH | Age: 62
End: 2022-01-22
Payer: COMMERCIAL

## 2022-01-22 PROCEDURE — G0299 HHS/HOSPICE OF RN EA 15 MIN: HCPCS

## 2022-01-23 VITALS
DIASTOLIC BLOOD PRESSURE: 62 MMHG | OXYGEN SATURATION: 90 % | HEART RATE: 58 BPM | TEMPERATURE: 99.4 F | SYSTOLIC BLOOD PRESSURE: 110 MMHG

## 2022-01-24 ENCOUNTER — HOME CARE VISIT (OUTPATIENT)
Dept: SCHEDULING | Facility: HOME HEALTH | Age: 62
End: 2022-01-24
Payer: COMMERCIAL

## 2022-01-24 PROCEDURE — G0299 HHS/HOSPICE OF RN EA 15 MIN: HCPCS

## 2022-01-25 VITALS
DIASTOLIC BLOOD PRESSURE: 76 MMHG | HEART RATE: 88 BPM | RESPIRATION RATE: 17 BRPM | TEMPERATURE: 99.3 F | SYSTOLIC BLOOD PRESSURE: 124 MMHG | OXYGEN SATURATION: 97 %

## 2022-01-26 ENCOUNTER — HOME CARE VISIT (OUTPATIENT)
Dept: SCHEDULING | Facility: HOME HEALTH | Age: 62
End: 2022-01-26
Payer: COMMERCIAL

## 2022-01-26 VITALS
TEMPERATURE: 98.4 F | HEART RATE: 78 BPM | RESPIRATION RATE: 19 BRPM | OXYGEN SATURATION: 94 % | SYSTOLIC BLOOD PRESSURE: 124 MMHG | DIASTOLIC BLOOD PRESSURE: 68 MMHG

## 2022-01-26 PROCEDURE — G0299 HHS/HOSPICE OF RN EA 15 MIN: HCPCS

## 2022-01-28 ENCOUNTER — HOME CARE VISIT (OUTPATIENT)
Dept: SCHEDULING | Facility: HOME HEALTH | Age: 62
End: 2022-01-28
Payer: COMMERCIAL

## 2022-01-28 PROCEDURE — G0299 HHS/HOSPICE OF RN EA 15 MIN: HCPCS

## 2022-01-30 VITALS
TEMPERATURE: 98 F | DIASTOLIC BLOOD PRESSURE: 78 MMHG | SYSTOLIC BLOOD PRESSURE: 132 MMHG | RESPIRATION RATE: 15 BRPM | HEART RATE: 78 BPM | OXYGEN SATURATION: 96 %

## 2022-01-31 ENCOUNTER — HOME CARE VISIT (OUTPATIENT)
Dept: SCHEDULING | Facility: HOME HEALTH | Age: 62
End: 2022-01-31
Payer: COMMERCIAL

## 2022-01-31 VITALS
OXYGEN SATURATION: 92 % | TEMPERATURE: 98.2 F | SYSTOLIC BLOOD PRESSURE: 122 MMHG | HEART RATE: 80 BPM | RESPIRATION RATE: 18 BRPM | DIASTOLIC BLOOD PRESSURE: 62 MMHG

## 2022-01-31 PROCEDURE — G0299 HHS/HOSPICE OF RN EA 15 MIN: HCPCS

## 2022-02-02 ENCOUNTER — HOME CARE VISIT (OUTPATIENT)
Dept: SCHEDULING | Facility: HOME HEALTH | Age: 62
End: 2022-02-02
Payer: COMMERCIAL

## 2022-02-02 PROCEDURE — G0299 HHS/HOSPICE OF RN EA 15 MIN: HCPCS

## 2022-02-03 VITALS
DIASTOLIC BLOOD PRESSURE: 78 MMHG | OXYGEN SATURATION: 97 % | RESPIRATION RATE: 19 BRPM | SYSTOLIC BLOOD PRESSURE: 124 MMHG | TEMPERATURE: 98.4 F | HEART RATE: 78 BPM

## 2022-02-04 ENCOUNTER — HOME CARE VISIT (OUTPATIENT)
Dept: SCHEDULING | Facility: HOME HEALTH | Age: 62
End: 2022-02-04
Payer: COMMERCIAL

## 2022-02-04 PROCEDURE — 400014 HH F/U

## 2022-02-04 PROCEDURE — G0299 HHS/HOSPICE OF RN EA 15 MIN: HCPCS

## 2022-02-05 VITALS
SYSTOLIC BLOOD PRESSURE: 106 MMHG | HEART RATE: 78 BPM | RESPIRATION RATE: 19 BRPM | OXYGEN SATURATION: 97 % | TEMPERATURE: 98.4 F | DIASTOLIC BLOOD PRESSURE: 64 MMHG

## 2022-02-07 ENCOUNTER — HOME CARE VISIT (OUTPATIENT)
Dept: SCHEDULING | Facility: HOME HEALTH | Age: 62
End: 2022-02-07
Payer: COMMERCIAL

## 2022-02-07 VITALS
SYSTOLIC BLOOD PRESSURE: 102 MMHG | TEMPERATURE: 97.8 F | HEART RATE: 82 BPM | DIASTOLIC BLOOD PRESSURE: 72 MMHG | RESPIRATION RATE: 15 BRPM

## 2022-02-07 PROCEDURE — G0299 HHS/HOSPICE OF RN EA 15 MIN: HCPCS

## 2022-02-09 ENCOUNTER — HOME CARE VISIT (OUTPATIENT)
Dept: SCHEDULING | Facility: HOME HEALTH | Age: 62
End: 2022-02-09
Payer: COMMERCIAL

## 2022-02-09 VITALS
HEART RATE: 76 BPM | TEMPERATURE: 98.5 F | SYSTOLIC BLOOD PRESSURE: 128 MMHG | OXYGEN SATURATION: 97 % | RESPIRATION RATE: 16 BRPM | DIASTOLIC BLOOD PRESSURE: 76 MMHG

## 2022-02-09 PROCEDURE — G0299 HHS/HOSPICE OF RN EA 15 MIN: HCPCS

## 2022-02-11 ENCOUNTER — HOME CARE VISIT (OUTPATIENT)
Dept: SCHEDULING | Facility: HOME HEALTH | Age: 62
End: 2022-02-11
Payer: COMMERCIAL

## 2022-02-11 VITALS
TEMPERATURE: 98.6 F | HEART RATE: 68 BPM | SYSTOLIC BLOOD PRESSURE: 108 MMHG | RESPIRATION RATE: 12 BRPM | DIASTOLIC BLOOD PRESSURE: 80 MMHG

## 2022-02-11 PROCEDURE — A6403 STERILE GAUZE>16 <= 48 SQ IN: HCPCS

## 2022-02-11 PROCEDURE — A6216 NON-STERILE GAUZE<=16 SQ IN: HCPCS

## 2022-02-11 PROCEDURE — A6198 ALGINATE DRESSING > 48 SQ IN: HCPCS

## 2022-02-11 PROCEDURE — A6446 CONFORM BAND S W>=3" <5"/YD: HCPCS

## 2022-02-11 PROCEDURE — A9270 NON-COVERED ITEM OR SERVICE: HCPCS

## 2022-02-11 PROCEDURE — A4649 SURGICAL SUPPLIES: HCPCS

## 2022-02-11 PROCEDURE — G0299 HHS/HOSPICE OF RN EA 15 MIN: HCPCS

## 2022-02-14 ENCOUNTER — HOME CARE VISIT (OUTPATIENT)
Dept: SCHEDULING | Facility: HOME HEALTH | Age: 62
End: 2022-02-14
Payer: COMMERCIAL

## 2022-02-14 VITALS
SYSTOLIC BLOOD PRESSURE: 118 MMHG | OXYGEN SATURATION: 93 % | DIASTOLIC BLOOD PRESSURE: 76 MMHG | TEMPERATURE: 98.6 F | HEART RATE: 78 BPM | RESPIRATION RATE: 16 BRPM

## 2022-02-14 PROCEDURE — G0299 HHS/HOSPICE OF RN EA 15 MIN: HCPCS

## 2022-02-16 ENCOUNTER — HOME CARE VISIT (OUTPATIENT)
Dept: SCHEDULING | Facility: HOME HEALTH | Age: 62
End: 2022-02-16
Payer: COMMERCIAL

## 2022-02-16 VITALS
RESPIRATION RATE: 12 BRPM | HEART RATE: 64 BPM | TEMPERATURE: 98.2 F | DIASTOLIC BLOOD PRESSURE: 64 MMHG | SYSTOLIC BLOOD PRESSURE: 104 MMHG

## 2022-02-16 PROCEDURE — A6216 NON-STERILE GAUZE<=16 SQ IN: HCPCS

## 2022-02-16 PROCEDURE — A6403 STERILE GAUZE>16 <= 48 SQ IN: HCPCS

## 2022-02-16 PROCEDURE — A6198 ALGINATE DRESSING > 48 SQ IN: HCPCS

## 2022-02-16 PROCEDURE — A6446 CONFORM BAND S W>=3" <5"/YD: HCPCS

## 2022-02-16 PROCEDURE — G0299 HHS/HOSPICE OF RN EA 15 MIN: HCPCS

## 2022-02-16 PROCEDURE — A9270 NON-COVERED ITEM OR SERVICE: HCPCS

## 2022-02-16 PROCEDURE — A4649 SURGICAL SUPPLIES: HCPCS

## 2022-02-18 ENCOUNTER — HOME CARE VISIT (OUTPATIENT)
Dept: SCHEDULING | Facility: HOME HEALTH | Age: 62
End: 2022-02-18
Payer: COMMERCIAL

## 2022-02-18 VITALS
HEART RATE: 78 BPM | SYSTOLIC BLOOD PRESSURE: 104 MMHG | DIASTOLIC BLOOD PRESSURE: 66 MMHG | RESPIRATION RATE: 17 BRPM | TEMPERATURE: 97.8 F | OXYGEN SATURATION: 97 %

## 2022-02-18 PROCEDURE — G0299 HHS/HOSPICE OF RN EA 15 MIN: HCPCS

## 2022-02-21 ENCOUNTER — HOME CARE VISIT (OUTPATIENT)
Dept: SCHEDULING | Facility: HOME HEALTH | Age: 62
End: 2022-02-21
Payer: COMMERCIAL

## 2022-02-21 VITALS
SYSTOLIC BLOOD PRESSURE: 114 MMHG | HEART RATE: 62 BPM | RESPIRATION RATE: 14 BRPM | DIASTOLIC BLOOD PRESSURE: 60 MMHG | TEMPERATURE: 98.5 F | OXYGEN SATURATION: 94 %

## 2022-02-21 PROCEDURE — G0299 HHS/HOSPICE OF RN EA 15 MIN: HCPCS

## 2022-02-23 ENCOUNTER — HOME CARE VISIT (OUTPATIENT)
Dept: SCHEDULING | Facility: HOME HEALTH | Age: 62
End: 2022-02-23
Payer: COMMERCIAL

## 2022-02-23 PROCEDURE — G0299 HHS/HOSPICE OF RN EA 15 MIN: HCPCS

## 2022-02-24 VITALS
HEART RATE: 60 BPM | DIASTOLIC BLOOD PRESSURE: 60 MMHG | OXYGEN SATURATION: 97 % | TEMPERATURE: 97.4 F | RESPIRATION RATE: 17 BRPM | SYSTOLIC BLOOD PRESSURE: 108 MMHG

## 2022-02-25 ENCOUNTER — HOME CARE VISIT (OUTPATIENT)
Dept: SCHEDULING | Facility: HOME HEALTH | Age: 62
End: 2022-02-25
Payer: COMMERCIAL

## 2022-02-25 PROCEDURE — G0299 HHS/HOSPICE OF RN EA 15 MIN: HCPCS

## 2022-02-28 ENCOUNTER — HOME CARE VISIT (OUTPATIENT)
Dept: SCHEDULING | Facility: HOME HEALTH | Age: 62
End: 2022-02-28
Payer: COMMERCIAL

## 2022-02-28 VITALS
RESPIRATION RATE: 18 BRPM | OXYGEN SATURATION: 95 % | TEMPERATURE: 98.5 F | HEART RATE: 80 BPM | SYSTOLIC BLOOD PRESSURE: 120 MMHG | DIASTOLIC BLOOD PRESSURE: 62 MMHG

## 2022-03-01 ENCOUNTER — HOME CARE VISIT (OUTPATIENT)
Dept: SCHEDULING | Facility: HOME HEALTH | Age: 62
End: 2022-03-01
Payer: COMMERCIAL

## 2022-03-04 ENCOUNTER — HOME CARE VISIT (OUTPATIENT)
Dept: SCHEDULING | Facility: HOME HEALTH | Age: 62
End: 2022-03-04
Payer: COMMERCIAL

## 2022-03-04 PROCEDURE — G0299 HHS/HOSPICE OF RN EA 15 MIN: HCPCS

## 2022-03-05 VITALS
SYSTOLIC BLOOD PRESSURE: 136 MMHG | TEMPERATURE: 98.1 F | DIASTOLIC BLOOD PRESSURE: 82 MMHG | RESPIRATION RATE: 16 BRPM | HEART RATE: 82 BPM | OXYGEN SATURATION: 95 %

## 2022-03-07 ENCOUNTER — HOME CARE VISIT (OUTPATIENT)
Dept: SCHEDULING | Facility: HOME HEALTH | Age: 62
End: 2022-03-07
Payer: COMMERCIAL

## 2022-03-07 VITALS
HEART RATE: 80 BPM | OXYGEN SATURATION: 95 % | DIASTOLIC BLOOD PRESSURE: 78 MMHG | TEMPERATURE: 98.5 F | SYSTOLIC BLOOD PRESSURE: 124 MMHG | RESPIRATION RATE: 17 BRPM

## 2022-03-07 PROCEDURE — 400016 HH ROC

## 2022-03-07 PROCEDURE — G0299 HHS/HOSPICE OF RN EA 15 MIN: HCPCS

## 2022-03-08 ENCOUNTER — HOME CARE VISIT (OUTPATIENT)
Dept: SCHEDULING | Facility: HOME HEALTH | Age: 62
End: 2022-03-08
Payer: COMMERCIAL

## 2022-03-08 VITALS
TEMPERATURE: 99.8 F | OXYGEN SATURATION: 93 % | SYSTOLIC BLOOD PRESSURE: 120 MMHG | HEART RATE: 80 BPM | DIASTOLIC BLOOD PRESSURE: 62 MMHG | RESPIRATION RATE: 18 BRPM

## 2022-03-08 VITALS
TEMPERATURE: 97.3 F | RESPIRATION RATE: 17 BRPM | HEART RATE: 77 BPM | OXYGEN SATURATION: 98 % | SYSTOLIC BLOOD PRESSURE: 124 MMHG | DIASTOLIC BLOOD PRESSURE: 76 MMHG

## 2022-03-08 PROCEDURE — G0151 HHCP-SERV OF PT,EA 15 MIN: HCPCS

## 2022-03-08 PROCEDURE — G0299 HHS/HOSPICE OF RN EA 15 MIN: HCPCS

## 2022-03-09 ENCOUNTER — HOME CARE VISIT (OUTPATIENT)
Dept: HOME HEALTH SERVICES | Facility: HOME HEALTH | Age: 62
End: 2022-03-09
Payer: COMMERCIAL

## 2022-03-11 ENCOUNTER — HOME CARE VISIT (OUTPATIENT)
Dept: SCHEDULING | Facility: HOME HEALTH | Age: 62
End: 2022-03-11
Payer: COMMERCIAL

## 2022-03-11 PROCEDURE — G0299 HHS/HOSPICE OF RN EA 15 MIN: HCPCS

## 2022-03-12 VITALS
OXYGEN SATURATION: 97 % | TEMPERATURE: 98.4 F | DIASTOLIC BLOOD PRESSURE: 70 MMHG | RESPIRATION RATE: 17 BRPM | HEART RATE: 80 BPM | SYSTOLIC BLOOD PRESSURE: 126 MMHG

## 2022-03-14 ENCOUNTER — HOME CARE VISIT (OUTPATIENT)
Dept: SCHEDULING | Facility: HOME HEALTH | Age: 62
End: 2022-03-14
Payer: COMMERCIAL

## 2022-03-14 VITALS
TEMPERATURE: 98 F | OXYGEN SATURATION: 92 % | SYSTOLIC BLOOD PRESSURE: 128 MMHG | RESPIRATION RATE: 18 BRPM | HEART RATE: 80 BPM | DIASTOLIC BLOOD PRESSURE: 62 MMHG

## 2022-03-14 PROCEDURE — G0299 HHS/HOSPICE OF RN EA 15 MIN: HCPCS

## 2022-03-15 ENCOUNTER — HOME CARE VISIT (OUTPATIENT)
Dept: SCHEDULING | Facility: HOME HEALTH | Age: 62
End: 2022-03-15
Payer: COMMERCIAL

## 2022-03-15 VITALS
SYSTOLIC BLOOD PRESSURE: 122 MMHG | DIASTOLIC BLOOD PRESSURE: 60 MMHG | OXYGEN SATURATION: 94 % | RESPIRATION RATE: 20 BRPM | TEMPERATURE: 97.3 F | HEART RATE: 80 BPM

## 2022-03-15 PROCEDURE — G0157 HHC PT ASSISTANT EA 15: HCPCS

## 2022-03-16 ENCOUNTER — HOME CARE VISIT (OUTPATIENT)
Dept: SCHEDULING | Facility: HOME HEALTH | Age: 62
End: 2022-03-16
Payer: COMMERCIAL

## 2022-03-16 PROCEDURE — G0299 HHS/HOSPICE OF RN EA 15 MIN: HCPCS

## 2022-03-17 ENCOUNTER — HOME CARE VISIT (OUTPATIENT)
Dept: SCHEDULING | Facility: HOME HEALTH | Age: 62
End: 2022-03-17
Payer: COMMERCIAL

## 2022-03-17 PROCEDURE — G0151 HHCP-SERV OF PT,EA 15 MIN: HCPCS

## 2022-03-18 ENCOUNTER — HOME CARE VISIT (OUTPATIENT)
Dept: SCHEDULING | Facility: HOME HEALTH | Age: 62
End: 2022-03-18
Payer: COMMERCIAL

## 2022-03-18 VITALS
RESPIRATION RATE: 16 BRPM | SYSTOLIC BLOOD PRESSURE: 124 MMHG | HEART RATE: 68 BPM | OXYGEN SATURATION: 97 % | DIASTOLIC BLOOD PRESSURE: 70 MMHG | TEMPERATURE: 98.2 F

## 2022-03-18 PROBLEM — N17.9 AKI (ACUTE KIDNEY INJURY) (HCC): Status: ACTIVE | Noted: 2021-10-31

## 2022-03-18 PROBLEM — I99.8 ISCHEMIC LEG PAIN: Status: ACTIVE | Noted: 2021-10-31

## 2022-03-18 PROBLEM — F17.210 CIGARETTE SMOKER: Status: ACTIVE | Noted: 2019-10-30

## 2022-03-18 PROBLEM — Z98.62 HISTORY OF ANGIOPLASTY: Status: ACTIVE | Noted: 2020-01-31

## 2022-03-18 PROBLEM — M79.606 ISCHEMIC LEG PAIN: Status: ACTIVE | Noted: 2021-10-31

## 2022-03-18 PROCEDURE — G0299 HHS/HOSPICE OF RN EA 15 MIN: HCPCS

## 2022-03-19 PROBLEM — J43.8 OTHER EMPHYSEMA (HCC): Status: ACTIVE | Noted: 2018-07-13

## 2022-03-19 PROBLEM — K92.2 GIB (GASTROINTESTINAL BLEEDING): Status: ACTIVE | Noted: 2021-10-31

## 2022-03-19 PROBLEM — Z91.89 MULTIPLE RISK FACTORS FOR CORONARY ARTERY DISEASE: Status: ACTIVE | Noted: 2019-10-30

## 2022-03-20 VITALS
SYSTOLIC BLOOD PRESSURE: 118 MMHG | RESPIRATION RATE: 16 BRPM | HEART RATE: 76 BPM | DIASTOLIC BLOOD PRESSURE: 70 MMHG | TEMPERATURE: 98.2 F | OXYGEN SATURATION: 97 %

## 2022-03-20 VITALS
HEART RATE: 76 BPM | RESPIRATION RATE: 18 BRPM | TEMPERATURE: 98 F | DIASTOLIC BLOOD PRESSURE: 74 MMHG | SYSTOLIC BLOOD PRESSURE: 126 MMHG | OXYGEN SATURATION: 98 %

## 2022-03-20 PROBLEM — I73.9 PVD (PERIPHERAL VASCULAR DISEASE) (HCC): Status: ACTIVE | Noted: 2019-10-30

## 2022-03-20 PROBLEM — N52.9 ERECTILE DYSFUNCTION: Status: ACTIVE | Noted: 2020-01-31

## 2022-03-21 ENCOUNTER — HOME CARE VISIT (OUTPATIENT)
Dept: SCHEDULING | Facility: HOME HEALTH | Age: 62
End: 2022-03-21
Payer: COMMERCIAL

## 2022-03-21 VITALS
TEMPERATURE: 98.4 F | RESPIRATION RATE: 16 BRPM | HEART RATE: 70 BPM | DIASTOLIC BLOOD PRESSURE: 70 MMHG | SYSTOLIC BLOOD PRESSURE: 124 MMHG | OXYGEN SATURATION: 94 %

## 2022-03-21 PROCEDURE — G0299 HHS/HOSPICE OF RN EA 15 MIN: HCPCS

## 2022-03-24 ENCOUNTER — HOME CARE VISIT (OUTPATIENT)
Dept: SCHEDULING | Facility: HOME HEALTH | Age: 62
End: 2022-03-24
Payer: COMMERCIAL

## 2022-03-24 VITALS
HEART RATE: 80 BPM | OXYGEN SATURATION: 97 % | TEMPERATURE: 98.5 F | SYSTOLIC BLOOD PRESSURE: 122 MMHG | DIASTOLIC BLOOD PRESSURE: 70 MMHG | RESPIRATION RATE: 17 BRPM

## 2022-03-24 PROCEDURE — G0299 HHS/HOSPICE OF RN EA 15 MIN: HCPCS

## 2022-03-26 ENCOUNTER — HOME CARE VISIT (OUTPATIENT)
Dept: SCHEDULING | Facility: HOME HEALTH | Age: 62
End: 2022-03-26
Payer: COMMERCIAL

## 2022-03-26 PROCEDURE — G0299 HHS/HOSPICE OF RN EA 15 MIN: HCPCS

## 2022-03-27 VITALS
DIASTOLIC BLOOD PRESSURE: 70 MMHG | OXYGEN SATURATION: 97 % | RESPIRATION RATE: 16 BRPM | SYSTOLIC BLOOD PRESSURE: 124 MMHG | TEMPERATURE: 98.5 F | HEART RATE: 80 BPM

## 2022-03-28 ENCOUNTER — HOME CARE VISIT (OUTPATIENT)
Dept: SCHEDULING | Facility: HOME HEALTH | Age: 62
End: 2022-03-28
Payer: COMMERCIAL

## 2022-03-28 PROCEDURE — A5120 SKIN BARRIER, WIPE OR SWAB: HCPCS

## 2022-03-28 PROCEDURE — G0299 HHS/HOSPICE OF RN EA 15 MIN: HCPCS

## 2022-03-28 PROCEDURE — A4456 ADHESIVE REMOVER, WIPES: HCPCS

## 2022-03-28 PROCEDURE — MED12556 CAN,SPRAY,7.1-OZ,SALINE,WOUND WASH,STRL

## 2022-03-29 VITALS
OXYGEN SATURATION: 98 % | HEART RATE: 66 BPM | RESPIRATION RATE: 15 BRPM | DIASTOLIC BLOOD PRESSURE: 84 MMHG | SYSTOLIC BLOOD PRESSURE: 156 MMHG | TEMPERATURE: 98.4 F

## 2022-03-30 ENCOUNTER — HOME CARE VISIT (OUTPATIENT)
Dept: SCHEDULING | Facility: HOME HEALTH | Age: 62
End: 2022-03-30
Payer: COMMERCIAL

## 2022-03-30 VITALS
RESPIRATION RATE: 16 BRPM | HEART RATE: 70 BPM | TEMPERATURE: 97.7 F | DIASTOLIC BLOOD PRESSURE: 78 MMHG | SYSTOLIC BLOOD PRESSURE: 128 MMHG | OXYGEN SATURATION: 97 %

## 2022-03-30 PROCEDURE — G0299 HHS/HOSPICE OF RN EA 15 MIN: HCPCS

## 2022-04-01 ENCOUNTER — HOME CARE VISIT (OUTPATIENT)
Dept: SCHEDULING | Facility: HOME HEALTH | Age: 62
End: 2022-04-01
Payer: COMMERCIAL

## 2022-04-01 VITALS
DIASTOLIC BLOOD PRESSURE: 78 MMHG | TEMPERATURE: 97.8 F | OXYGEN SATURATION: 97 % | HEART RATE: 80 BPM | RESPIRATION RATE: 16 BRPM | SYSTOLIC BLOOD PRESSURE: 154 MMHG

## 2022-04-01 PROCEDURE — G0299 HHS/HOSPICE OF RN EA 15 MIN: HCPCS

## 2022-04-04 ENCOUNTER — HOME CARE VISIT (OUTPATIENT)
Dept: SCHEDULING | Facility: HOME HEALTH | Age: 62
End: 2022-04-04
Payer: COMMERCIAL

## 2022-04-04 PROCEDURE — 400016 HH ROC

## 2022-04-04 PROCEDURE — G0299 HHS/HOSPICE OF RN EA 15 MIN: HCPCS

## 2022-04-06 ENCOUNTER — HOME CARE VISIT (OUTPATIENT)
Dept: SCHEDULING | Facility: HOME HEALTH | Age: 62
End: 2022-04-06
Payer: COMMERCIAL

## 2022-04-06 VITALS
DIASTOLIC BLOOD PRESSURE: 76 MMHG | TEMPERATURE: 98.5 F | OXYGEN SATURATION: 97 % | SYSTOLIC BLOOD PRESSURE: 146 MMHG | TEMPERATURE: 98.4 F | SYSTOLIC BLOOD PRESSURE: 124 MMHG | HEART RATE: 80 BPM | RESPIRATION RATE: 17 BRPM | HEART RATE: 80 BPM | RESPIRATION RATE: 16 BRPM | DIASTOLIC BLOOD PRESSURE: 76 MMHG | OXYGEN SATURATION: 97 %

## 2022-04-06 PROCEDURE — G0299 HHS/HOSPICE OF RN EA 15 MIN: HCPCS

## 2022-04-08 ENCOUNTER — HOME CARE VISIT (OUTPATIENT)
Dept: SCHEDULING | Facility: HOME HEALTH | Age: 62
End: 2022-04-08
Payer: COMMERCIAL

## 2022-04-08 PROCEDURE — G0299 HHS/HOSPICE OF RN EA 15 MIN: HCPCS

## 2022-04-09 VITALS
SYSTOLIC BLOOD PRESSURE: 120 MMHG | TEMPERATURE: 98.5 F | RESPIRATION RATE: 16 BRPM | DIASTOLIC BLOOD PRESSURE: 64 MMHG | OXYGEN SATURATION: 98 % | HEART RATE: 78 BPM

## 2022-04-11 ENCOUNTER — HOME CARE VISIT (OUTPATIENT)
Dept: SCHEDULING | Facility: HOME HEALTH | Age: 62
End: 2022-04-11
Payer: COMMERCIAL

## 2022-04-11 VITALS
TEMPERATURE: 98.4 F | SYSTOLIC BLOOD PRESSURE: 122 MMHG | HEART RATE: 73 BPM | DIASTOLIC BLOOD PRESSURE: 80 MMHG | RESPIRATION RATE: 12 BRPM

## 2022-04-11 PROCEDURE — G0299 HHS/HOSPICE OF RN EA 15 MIN: HCPCS

## 2022-04-13 ENCOUNTER — HOME CARE VISIT (OUTPATIENT)
Dept: SCHEDULING | Facility: HOME HEALTH | Age: 62
End: 2022-04-13
Payer: COMMERCIAL

## 2022-04-13 VITALS
TEMPERATURE: 97.1 F | HEART RATE: 83 BPM | DIASTOLIC BLOOD PRESSURE: 60 MMHG | SYSTOLIC BLOOD PRESSURE: 120 MMHG | RESPIRATION RATE: 12 BRPM

## 2022-04-13 PROCEDURE — G0299 HHS/HOSPICE OF RN EA 15 MIN: HCPCS

## 2022-04-15 ENCOUNTER — HOME CARE VISIT (OUTPATIENT)
Dept: SCHEDULING | Facility: HOME HEALTH | Age: 62
End: 2022-04-15
Payer: COMMERCIAL

## 2022-04-15 VITALS
RESPIRATION RATE: 12 BRPM | SYSTOLIC BLOOD PRESSURE: 110 MMHG | DIASTOLIC BLOOD PRESSURE: 64 MMHG | HEART RATE: 74 BPM | TEMPERATURE: 98.1 F

## 2022-04-15 PROCEDURE — G0299 HHS/HOSPICE OF RN EA 15 MIN: HCPCS

## 2022-04-18 ENCOUNTER — HOME CARE VISIT (OUTPATIENT)
Dept: SCHEDULING | Facility: HOME HEALTH | Age: 62
End: 2022-04-18
Payer: COMMERCIAL

## 2022-04-18 VITALS
SYSTOLIC BLOOD PRESSURE: 134 MMHG | HEART RATE: 78 BPM | OXYGEN SATURATION: 97 % | DIASTOLIC BLOOD PRESSURE: 76 MMHG | RESPIRATION RATE: 15 BRPM | TEMPERATURE: 98.5 F

## 2022-04-18 PROCEDURE — G0299 HHS/HOSPICE OF RN EA 15 MIN: HCPCS

## 2022-04-20 ENCOUNTER — HOME CARE VISIT (OUTPATIENT)
Dept: SCHEDULING | Facility: HOME HEALTH | Age: 62
End: 2022-04-20
Payer: COMMERCIAL

## 2022-04-20 VITALS
SYSTOLIC BLOOD PRESSURE: 122 MMHG | TEMPERATURE: 98.6 F | DIASTOLIC BLOOD PRESSURE: 76 MMHG | OXYGEN SATURATION: 97 % | RESPIRATION RATE: 16 BRPM | HEART RATE: 76 BPM

## 2022-04-20 PROCEDURE — G0299 HHS/HOSPICE OF RN EA 15 MIN: HCPCS

## 2022-04-22 ENCOUNTER — HOME CARE VISIT (OUTPATIENT)
Dept: SCHEDULING | Facility: HOME HEALTH | Age: 62
End: 2022-04-22
Payer: COMMERCIAL

## 2022-04-26 ENCOUNTER — HOME CARE VISIT (OUTPATIENT)
Dept: SCHEDULING | Facility: HOME HEALTH | Age: 62
End: 2022-04-26
Payer: COMMERCIAL

## 2022-04-26 VITALS
SYSTOLIC BLOOD PRESSURE: 102 MMHG | TEMPERATURE: 98.1 F | HEART RATE: 70 BPM | RESPIRATION RATE: 16 BRPM | DIASTOLIC BLOOD PRESSURE: 64 MMHG

## 2022-04-26 PROCEDURE — G0151 HHCP-SERV OF PT,EA 15 MIN: HCPCS

## 2022-04-26 NOTE — Clinical Note
Situation/Background: 62year old male patient with PMH of tobacco abuse, emphysema, HTN, PVD, CAD. Lives alone in single level home with ramp to enter. Patient was being seen by homecare services since early October 2021 following hospitalization with GI bleed and RLE vascular issues. Patient was readmitted to hospital several times to address RLE vascular issues and wound care. Patient being resumed now following hospitalization for RLE AKA on 4/21/22. Discharged home 4/24/22. Assessment: Patient is currently using wheelchair for primary mobility and is using RW for balance during pivot transfers. Is having some tightness in R hip flexors. Recommendation: R hip HEP and therex for mobility and to prepare for prosthetic fitting and gait training, LLE strengthening, transfer and balance training; 1w2; certification period to end 5/3/22 so will need recert.

## 2022-04-29 ENCOUNTER — HOME CARE VISIT (OUTPATIENT)
Dept: SCHEDULING | Facility: HOME HEALTH | Age: 62
End: 2022-04-29
Payer: COMMERCIAL

## 2022-04-29 PROCEDURE — G0157 HHC PT ASSISTANT EA 15: HCPCS

## 2022-05-01 VITALS
SYSTOLIC BLOOD PRESSURE: 116 MMHG | OXYGEN SATURATION: 97 % | TEMPERATURE: 97.2 F | RESPIRATION RATE: 17 BRPM | HEART RATE: 60 BPM | DIASTOLIC BLOOD PRESSURE: 60 MMHG

## 2022-05-03 ENCOUNTER — HOME CARE VISIT (OUTPATIENT)
Dept: SCHEDULING | Facility: HOME HEALTH | Age: 62
End: 2022-05-03
Payer: COMMERCIAL

## 2022-05-03 PROCEDURE — G0151 HHCP-SERV OF PT,EA 15 MIN: HCPCS

## 2022-05-05 ENCOUNTER — HOME CARE VISIT (OUTPATIENT)
Dept: SCHEDULING | Facility: HOME HEALTH | Age: 62
End: 2022-05-05
Payer: COMMERCIAL

## 2022-05-05 VITALS
HEART RATE: 83 BPM | RESPIRATION RATE: 17 BRPM | OXYGEN SATURATION: 98 % | SYSTOLIC BLOOD PRESSURE: 124 MMHG | TEMPERATURE: 98.3 F | DIASTOLIC BLOOD PRESSURE: 72 MMHG

## 2022-05-05 PROCEDURE — 400014 HH F/U

## 2022-05-05 PROCEDURE — G0151 HHCP-SERV OF PT,EA 15 MIN: HCPCS

## 2022-05-08 VITALS
TEMPERATURE: 97 F | RESPIRATION RATE: 18 BRPM | SYSTOLIC BLOOD PRESSURE: 124 MMHG | DIASTOLIC BLOOD PRESSURE: 72 MMHG | OXYGEN SATURATION: 98 % | HEART RATE: 83 BPM

## 2022-05-10 ENCOUNTER — HOME CARE VISIT (OUTPATIENT)
Dept: SCHEDULING | Facility: HOME HEALTH | Age: 62
End: 2022-05-10
Payer: COMMERCIAL

## 2022-05-10 PROCEDURE — G0151 HHCP-SERV OF PT,EA 15 MIN: HCPCS

## 2022-05-12 VITALS
SYSTOLIC BLOOD PRESSURE: 126 MMHG | RESPIRATION RATE: 17 BRPM | HEART RATE: 77 BPM | DIASTOLIC BLOOD PRESSURE: 74 MMHG | OXYGEN SATURATION: 98 % | TEMPERATURE: 98.3 F

## 2022-05-13 ENCOUNTER — HOME CARE VISIT (OUTPATIENT)
Dept: SCHEDULING | Facility: HOME HEALTH | Age: 62
End: 2022-05-13
Payer: COMMERCIAL

## 2022-05-13 VITALS
RESPIRATION RATE: 17 BRPM | OXYGEN SATURATION: 97 % | DIASTOLIC BLOOD PRESSURE: 68 MMHG | SYSTOLIC BLOOD PRESSURE: 126 MMHG | TEMPERATURE: 97.3 F | HEART RATE: 77 BPM

## 2022-05-13 PROCEDURE — G0151 HHCP-SERV OF PT,EA 15 MIN: HCPCS

## 2022-05-17 ENCOUNTER — HOME CARE VISIT (OUTPATIENT)
Dept: HOME HEALTH SERVICES | Facility: HOME HEALTH | Age: 62
End: 2022-05-17
Payer: COMMERCIAL

## 2022-05-17 VITALS
HEART RATE: 78 BPM | DIASTOLIC BLOOD PRESSURE: 62 MMHG | RESPIRATION RATE: 18 BRPM | SYSTOLIC BLOOD PRESSURE: 118 MMHG | TEMPERATURE: 97.3 F | OXYGEN SATURATION: 95 %

## 2022-05-17 PROCEDURE — G0157 HHC PT ASSISTANT EA 15: HCPCS

## 2022-05-18 ENCOUNTER — HOME CARE VISIT (OUTPATIENT)
Dept: SCHEDULING | Facility: HOME HEALTH | Age: 62
End: 2022-05-18
Payer: COMMERCIAL

## 2022-05-18 VITALS
DIASTOLIC BLOOD PRESSURE: 70 MMHG | RESPIRATION RATE: 16 BRPM | SYSTOLIC BLOOD PRESSURE: 122 MMHG | OXYGEN SATURATION: 96 % | TEMPERATURE: 97.8 F | HEART RATE: 88 BPM

## 2022-05-18 PROCEDURE — G0152 HHCP-SERV OF OT,EA 15 MIN: HCPCS

## 2022-05-19 ENCOUNTER — HOME CARE VISIT (OUTPATIENT)
Dept: SCHEDULING | Facility: HOME HEALTH | Age: 62
End: 2022-05-19
Payer: COMMERCIAL

## 2022-05-19 PROCEDURE — G0157 HHC PT ASSISTANT EA 15: HCPCS

## 2022-05-22 VITALS
SYSTOLIC BLOOD PRESSURE: 122 MMHG | TEMPERATURE: 97.2 F | DIASTOLIC BLOOD PRESSURE: 62 MMHG | HEART RATE: 88 BPM | RESPIRATION RATE: 17 BRPM | OXYGEN SATURATION: 98 %

## 2022-05-24 ENCOUNTER — HOME CARE VISIT (OUTPATIENT)
Dept: SCHEDULING | Facility: HOME HEALTH | Age: 62
End: 2022-05-24
Payer: COMMERCIAL

## 2022-05-24 PROCEDURE — G0151 HHCP-SERV OF PT,EA 15 MIN: HCPCS

## 2022-05-30 ENCOUNTER — HOME CARE VISIT (OUTPATIENT)
Dept: SCHEDULING | Facility: HOME HEALTH | Age: 62
End: 2022-05-30
Payer: COMMERCIAL

## 2022-05-30 VITALS
OXYGEN SATURATION: 98 % | TEMPERATURE: 98.1 F | HEART RATE: 83 BPM | RESPIRATION RATE: 17 BRPM | SYSTOLIC BLOOD PRESSURE: 126 MMHG | DIASTOLIC BLOOD PRESSURE: 74 MMHG

## 2022-05-30 PROCEDURE — G0151 HHCP-SERV OF PT,EA 15 MIN: HCPCS

## 2022-06-03 VITALS
HEART RATE: 83 BPM | RESPIRATION RATE: 18 BRPM | SYSTOLIC BLOOD PRESSURE: 126 MMHG | TEMPERATURE: 98.5 F | OXYGEN SATURATION: 98 % | DIASTOLIC BLOOD PRESSURE: 74 MMHG

## 2022-08-22 RX ORDER — LISINOPRIL 2.5 MG/1
2.5 TABLET ORAL DAILY
Qty: 30 TABLET | Refills: 5 | Status: SHIPPED | OUTPATIENT
Start: 2022-08-22

## 2022-08-22 RX ORDER — CARVEDILOL 6.25 MG/1
6.25 TABLET ORAL 2 TIMES DAILY
Qty: 60 TABLET | Refills: 5 | Status: SHIPPED | OUTPATIENT
Start: 2022-08-22 | End: 2022-09-23

## 2022-08-22 RX ORDER — TRAZODONE HYDROCHLORIDE 100 MG/1
100 TABLET ORAL NIGHTLY
Qty: 30 TABLET | Refills: 5 | Status: SHIPPED | OUTPATIENT
Start: 2022-08-22

## 2022-08-22 RX ORDER — ATORVASTATIN CALCIUM 80 MG/1
80 TABLET, FILM COATED ORAL DAILY
Qty: 30 TABLET | Refills: 5 | Status: SHIPPED | OUTPATIENT
Start: 2022-08-22

## 2022-08-22 RX ORDER — PANTOPRAZOLE SODIUM 40 MG/1
40 TABLET, DELAYED RELEASE ORAL DAILY
Qty: 30 TABLET | Refills: 5 | Status: SHIPPED | OUTPATIENT
Start: 2022-08-22

## 2022-08-22 RX ORDER — GABAPENTIN 100 MG/1
200 CAPSULE ORAL 3 TIMES DAILY
Qty: 180 CAPSULE | Refills: 5 | Status: SHIPPED | OUTPATIENT
Start: 2022-08-22 | End: 2023-02-18

## 2022-08-23 RX ORDER — TRAZODONE HYDROCHLORIDE 100 MG/1
TABLET ORAL
Qty: 30 TABLET | Refills: 0 | OUTPATIENT
Start: 2022-08-23

## 2022-08-23 RX ORDER — PANTOPRAZOLE SODIUM 40 MG/1
TABLET, DELAYED RELEASE ORAL
Qty: 30 TABLET | Refills: 0 | OUTPATIENT
Start: 2022-08-23

## 2022-08-23 RX ORDER — ATORVASTATIN CALCIUM 80 MG/1
TABLET, FILM COATED ORAL
Qty: 30 TABLET | Refills: 0 | OUTPATIENT
Start: 2022-08-23

## 2022-09-06 ENCOUNTER — TELEPHONE (OUTPATIENT)
Dept: FAMILY MEDICINE CLINIC | Facility: CLINIC | Age: 62
End: 2022-09-06

## 2022-09-06 DIAGNOSIS — S78.111A ABOVE KNEE AMPUTATION OF RIGHT LOWER EXTREMITY (HCC): Primary | ICD-10-CM

## 2022-09-06 NOTE — TELEPHONE ENCOUNTER
Patient called and stated he got his prosthetic leg and needs a referral to [facility] to start physical therapy.   Phone number [phone]  Fax number [phone]

## 2022-09-23 ENCOUNTER — OFFICE VISIT (OUTPATIENT)
Dept: FAMILY MEDICINE CLINIC | Facility: CLINIC | Age: 62
End: 2022-09-23
Payer: COMMERCIAL

## 2022-09-23 VITALS — OXYGEN SATURATION: 97 % | SYSTOLIC BLOOD PRESSURE: 122 MMHG | DIASTOLIC BLOOD PRESSURE: 60 MMHG | HEART RATE: 63 BPM

## 2022-09-23 DIAGNOSIS — K27.9 PEPTIC ULCER, SITE UNSPECIFIED, UNSPECIFIED AS ACUTE OR CHRONIC, WITHOUT HEMORRHAGE OR PERFORATION: ICD-10-CM

## 2022-09-23 DIAGNOSIS — I10 ESSENTIAL (PRIMARY) HYPERTENSION: Primary | ICD-10-CM

## 2022-09-23 DIAGNOSIS — D64.9 ANEMIA, UNSPECIFIED TYPE: ICD-10-CM

## 2022-09-23 DIAGNOSIS — I10 ESSENTIAL (PRIMARY) HYPERTENSION: ICD-10-CM

## 2022-09-23 DIAGNOSIS — R21 RASH OF GROIN: ICD-10-CM

## 2022-09-23 DIAGNOSIS — Z72.0 TOBACCO ABUSE: ICD-10-CM

## 2022-09-23 DIAGNOSIS — Z23 NEED FOR IMMUNIZATION AGAINST INFLUENZA: ICD-10-CM

## 2022-09-23 LAB
BASOPHILS # BLD: 0.1 K/UL (ref 0–0.2)
BASOPHILS NFR BLD: 1 % (ref 0–2)
DIFFERENTIAL METHOD BLD: ABNORMAL
EOSINOPHIL # BLD: 0.3 K/UL (ref 0–0.8)
EOSINOPHIL NFR BLD: 4 % (ref 0.5–7.8)
ERYTHROCYTE [DISTWIDTH] IN BLOOD BY AUTOMATED COUNT: 18.3 % (ref 11.9–14.6)
HCT VFR BLD AUTO: 41 % (ref 41.1–50.3)
HGB BLD-MCNC: 12.6 G/DL (ref 13.6–17.2)
IMM GRANULOCYTES # BLD AUTO: 0 K/UL (ref 0–0.5)
IMM GRANULOCYTES NFR BLD AUTO: 0 % (ref 0–5)
LYMPHOCYTES # BLD: 1.5 K/UL (ref 0.5–4.6)
LYMPHOCYTES NFR BLD: 19 % (ref 13–44)
MCH RBC QN AUTO: 26.9 PG (ref 26.1–32.9)
MCHC RBC AUTO-ENTMCNC: 30.7 G/DL (ref 31.4–35)
MCV RBC AUTO: 87.6 FL (ref 79.6–97.8)
MONOCYTES # BLD: 0.6 K/UL (ref 0.1–1.3)
MONOCYTES NFR BLD: 8 % (ref 4–12)
NEUTS SEG # BLD: 5.3 K/UL (ref 1.7–8.2)
NEUTS SEG NFR BLD: 68 % (ref 43–78)
NRBC # BLD: 0 K/UL (ref 0–0.2)
PLATELET # BLD AUTO: 240 K/UL (ref 150–450)
PMV BLD AUTO: 11.4 FL (ref 9.4–12.3)
RBC # BLD AUTO: 4.68 M/UL (ref 4.23–5.6)
WBC # BLD AUTO: 7.8 K/UL (ref 4.3–11.1)

## 2022-09-23 PROCEDURE — 99214 OFFICE O/P EST MOD 30 MIN: CPT | Performed by: STUDENT IN AN ORGANIZED HEALTH CARE EDUCATION/TRAINING PROGRAM

## 2022-09-23 PROCEDURE — 90471 IMMUNIZATION ADMIN: CPT | Performed by: STUDENT IN AN ORGANIZED HEALTH CARE EDUCATION/TRAINING PROGRAM

## 2022-09-23 PROCEDURE — 90694 VACC AIIV4 NO PRSRV 0.5ML IM: CPT | Performed by: STUDENT IN AN ORGANIZED HEALTH CARE EDUCATION/TRAINING PROGRAM

## 2022-09-23 RX ORDER — PRENATAL VIT 91/IRON/FOLIC/DHA 28-975-200
COMBINATION PACKAGE (EA) ORAL
Qty: 28.4 G | Refills: 0 | Status: SHIPPED | OUTPATIENT
Start: 2022-09-23

## 2022-09-23 RX ORDER — BUPROPION HYDROCHLORIDE 150 MG/1
150 TABLET, EXTENDED RELEASE ORAL 2 TIMES DAILY
Qty: 60 TABLET | Refills: 3 | Status: SHIPPED | OUTPATIENT
Start: 2022-09-23

## 2022-09-23 ASSESSMENT — ENCOUNTER SYMPTOMS
BLOOD IN STOOL: 0
ABDOMINAL PAIN: 0

## 2022-09-23 NOTE — PROGRESS NOTES
[facility]  [address]  Phone [phone]  Fax:  [phone]    Marcos Rivera (:  1960) is a 62 y.o. male here for evaluation of the following chief complaint(s):  Chronic Pain (Refill norco)       ASSESSMENT/PLAN:  1. Essential (primary) hypertension  -     Lipid Panel; Future  -     CBC with Auto Differential; Future  -     Comprehensive Metabolic Panel; Future  2. Peptic ulcer, site unspecified, unspecified as acute or chronic, without hemorrhage or perforation  3. Anemia, unspecified type  -     Vitamin B12; Future  -     Folate; Future  -     Transferrin Saturation; Future  4. Need for immunization against influenza  -     Influenza, FLUAD, (age 72 y+), IM, Preservative Free, 0.5 mL  5. Tobacco abuse  6. Rash of groin    Blood pressure well controlled, continue lisinopril. Significant cardiovascular disease, continue high-dose Lipitor and aspirin. Continue Protonix daily for history of peptic ulcer. History of anemia, check B12, folate, and iron saturation. Consumes 2-3 beers every day. Smokes 1 pack/day. Strongly advised patient to quit smoking giving his medical history. Will have patient try Wellbutrin to see if this will help with smoking cessation. Has erythematous rash over left groin, suspect fungal etiology. Will have patient try antifungal cream.    Return in about 3 months (around 2022) for routine f/u. Subjective   SUBJECTIVE/OBJECTIVE:  HPI  81-year-old male with PMH of CAD, PVD, COPD, HTN, HLD, history of DVT/PE, fatty liver, and gastric ulcer who presents for regular 6-month follow-up.   Here with his son.  [facility] vascular surgery  for PAD with history of right AKA, on aspirin and statin  -No ulcers left leg, no rest pain, does have some mild claudication pain  -Takes Trazodone for insomnia  -Has been taking Norco about once daily for leg pain  -Still smoking 1PPD, tried patches but didn't help  -Erythematous rash left groin, has been using antifungal spray    Review of Systems   Gastrointestinal:  Negative for abdominal pain and blood in stool. Objective     Vitals:    09/23/22 1050   BP: 122/60   Pulse: 63   SpO2: 97%       Physical Exam  Vitals reviewed. Constitutional:       General: He is not in acute distress. Appearance: He is normal weight. HENT:      Head: Normocephalic and atraumatic. Cardiovascular:      Rate and Rhythm: Normal rate and regular rhythm. Heart sounds: Murmur (Early systolic) heard. Pulmonary:      Effort: Pulmonary effort is normal.      Breath sounds: Normal breath sounds. Musculoskeletal:      Right lower leg: No edema. Left lower leg: No edema. Skin:     General: Skin is warm and dry. Findings: Rash (Erythematous left groin rash) present. Neurological:      General: No focal deficit present. Mental Status: He is alert and oriented to person, place, and time. An electronic signature was used to authenticate this note.     --Nelson Victoria MD

## 2022-09-24 LAB
ALBUMIN SERPL-MCNC: 3.8 G/DL (ref 3.2–4.6)
ALBUMIN/GLOB SERPL: 1.4 {RATIO} (ref 1.2–3.5)
ALP SERPL-CCNC: 91 U/L (ref 50–136)
ALT SERPL-CCNC: 25 U/L (ref 12–65)
ANION GAP SERPL CALC-SCNC: 6 MMOL/L (ref 4–13)
AST SERPL-CCNC: 15 U/L (ref 15–37)
BILIRUB SERPL-MCNC: 0.6 MG/DL (ref 0.2–1.1)
BUN SERPL-MCNC: 11 MG/DL (ref 8–23)
CALCIUM SERPL-MCNC: 9.8 MG/DL (ref 8.3–10.4)
CHLORIDE SERPL-SCNC: 106 MMOL/L (ref 101–110)
CHOLEST SERPL-MCNC: 137 MG/DL
CO2 SERPL-SCNC: 28 MMOL/L (ref 21–32)
CREAT SERPL-MCNC: 0.9 MG/DL (ref 0.8–1.5)
FOLATE SERPL-MCNC: 19.7 NG/ML (ref 3.1–17.5)
GLOBULIN SER CALC-MCNC: 2.7 G/DL (ref 2.3–3.5)
GLUCOSE SERPL-MCNC: 99 MG/DL (ref 65–100)
HDLC SERPL-MCNC: 61 MG/DL (ref 40–60)
HDLC SERPL: 2.2 {RATIO}
IRON SATN MFR SERPL: 27 %
IRON SERPL-MCNC: 114 UG/DL (ref 35–150)
LDLC SERPL CALC-MCNC: 57.6 MG/DL
POTASSIUM SERPL-SCNC: 4.4 MMOL/L (ref 3.5–5.1)
PROT SERPL-MCNC: 6.5 G/DL (ref 6.3–8.2)
SODIUM SERPL-SCNC: 140 MMOL/L (ref 138–145)
TIBC SERPL-MCNC: 418 UG/DL (ref 250–450)
TRIGL SERPL-MCNC: 92 MG/DL (ref 35–150)
VIT B12 SERPL-MCNC: 234 PG/ML (ref 193–986)
VLDLC SERPL CALC-MCNC: 18.4 MG/DL (ref 6–23)

## 2022-12-27 ENCOUNTER — OFFICE VISIT (OUTPATIENT)
Dept: FAMILY MEDICINE CLINIC | Facility: CLINIC | Age: 62
End: 2022-12-27
Payer: COMMERCIAL

## 2022-12-27 VITALS
WEIGHT: 162.5 LBS | BODY MASS INDEX: 23.26 KG/M2 | HEART RATE: 84 BPM | SYSTOLIC BLOOD PRESSURE: 150 MMHG | TEMPERATURE: 98.1 F | DIASTOLIC BLOOD PRESSURE: 88 MMHG | HEIGHT: 70 IN

## 2022-12-27 DIAGNOSIS — I10 ESSENTIAL (PRIMARY) HYPERTENSION: Primary | ICD-10-CM

## 2022-12-27 DIAGNOSIS — I25.118 ATHEROSCLEROTIC HEART DISEASE OF NATIVE CORONARY ARTERY WITH OTHER FORMS OF ANGINA PECTORIS (HCC): ICD-10-CM

## 2022-12-27 DIAGNOSIS — I73.9 PERIPHERAL VASCULAR DISEASE, UNSPECIFIED (HCC): ICD-10-CM

## 2022-12-27 DIAGNOSIS — Z72.0 TOBACCO ABUSE: ICD-10-CM

## 2022-12-27 DIAGNOSIS — Z12.11 COLON CANCER SCREENING: ICD-10-CM

## 2022-12-27 PROCEDURE — 3078F DIAST BP <80 MM HG: CPT | Performed by: STUDENT IN AN ORGANIZED HEALTH CARE EDUCATION/TRAINING PROGRAM

## 2022-12-27 PROCEDURE — 3074F SYST BP LT 130 MM HG: CPT | Performed by: STUDENT IN AN ORGANIZED HEALTH CARE EDUCATION/TRAINING PROGRAM

## 2022-12-27 PROCEDURE — 99214 OFFICE O/P EST MOD 30 MIN: CPT | Performed by: STUDENT IN AN ORGANIZED HEALTH CARE EDUCATION/TRAINING PROGRAM

## 2022-12-27 NOTE — PROGRESS NOTES
[facility]  [address]  Phone [phone]  Fax:  [phone]    Juan Robert (:  1960) is a 65 y.o. male here for evaluation of the following chief complaint(s):  Follow-up (3 month, feeling fine)       ASSESSMENT/PLAN:  1. Essential (primary) hypertension  2. Colon cancer screening  -     POCT FECAL IMMUNOCHEMICAL TEST (FIT) ()  3. Peripheral vascular disease, unspecified ([facility] [address]  4. Atherosclerotic heart disease of native coronary artery with other forms of angina pectoris ([facility] [address]  5. Tobacco abuse    Blood pressure mildly elevated today, advised patient to monitor BP at home and let me know if it is high. Continue low-dose lisinopril. Followed by vascular surgery for PAD. On aspirin 81 and high-dose statin. Continues to smoke about 1 pack/day, reports Wellbutrin has helped a little with the nicotine cravings. Continue Wellbutrin, strongly advised patient to quit smoking. CBC in September showed stable mild anemia with hemoglobin of 12.6. Iron saturation was normal.  Vitamin B12 and folate were not low. Negative fecal occult blood in . Recommended screening colonoscopy but patient declines this. He is willing to do FIT. Return in about 6 months (around 2023) for routine f/u. Subjective   SUBJECTIVE/OBJECTIVE:  HPI  62-year-old male with PMH of CAD, PVD, COPD, HTN, HLD, history of DVT/PE, fatty liver, and gastric ulcer who presents for regular 3-month follow-up. -Started on Wellbutrin at last visit to hopefully help with smoking cessation  -Tolerating Wellbutrin, has cut nicotine cravings a little  -Smoking about 1PPD  -Denies hematochezia/melena    Review of Systems       Objective     Vitals:    22 1537   BP: (!) 150/88   Pulse: 84   Temp: 98.1 °F (36.7 °C)       Physical Exam  Vitals reviewed. Constitutional:       General: He is not in acute distress. Appearance: He is normal weight.    HENT:      Head: Normocephalic and atraumatic. Cardiovascular:      Rate and Rhythm: Normal rate and regular rhythm. Heart sounds: Murmur (Systolic) heard. Pulmonary:      Effort: Pulmonary effort is normal.      Breath sounds: Normal breath sounds. Musculoskeletal:      Right lower leg: No edema. Left lower leg: No edema. Comments: Right AKA   Skin:     General: Skin is warm and dry. Neurological:      General: No focal deficit present. Mental Status: He is alert and oriented to person, place, and time. An electronic signature was used to authenticate this note.     --Lasha Nunes MD

## 2023-02-17 RX ORDER — GABAPENTIN 100 MG/1
200 CAPSULE ORAL 3 TIMES DAILY
Qty: 180 CAPSULE | Refills: 5 | Status: SHIPPED | OUTPATIENT
Start: 2023-02-17 | End: 2023-08-16

## 2023-02-17 RX ORDER — TRAZODONE HYDROCHLORIDE 100 MG/1
100 TABLET ORAL NIGHTLY
Qty: 90 TABLET | Refills: 1 | Status: SHIPPED | OUTPATIENT
Start: 2023-02-17

## 2023-02-17 RX ORDER — ATORVASTATIN CALCIUM 80 MG/1
80 TABLET, FILM COATED ORAL DAILY
Qty: 90 TABLET | Refills: 1 | Status: SHIPPED | OUTPATIENT
Start: 2023-02-17

## 2023-02-17 RX ORDER — LISINOPRIL 2.5 MG/1
2.5 TABLET ORAL DAILY
Qty: 90 TABLET | Refills: 1 | Status: SHIPPED | OUTPATIENT
Start: 2023-02-17

## 2023-02-17 RX ORDER — PANTOPRAZOLE SODIUM 40 MG/1
40 TABLET, DELAYED RELEASE ORAL DAILY
Qty: 90 TABLET | Refills: 1 | Status: SHIPPED | OUTPATIENT
Start: 2023-02-17

## 2023-02-19 NOTE — PROGRESS NOTES
71-year-old with history of general anxiety disorder and depression presents emerged department for concerns of cough. Patient she feels like her throat is closing. She is currently asymptomatic. She denies having cough, chest pain, shortness of breath, abdominal pain, nausea, vomiting, sore throat, congestion, vaginal discharge, GI bleed. She is alert to herself to place but not month and year. I spoke with the patient's  over the phone who told me that the patient is normally uncooperative and has psychiatric issues that is being followed up with her primary care physician. There are no reports of new rashes new food, new antibiotics sick contacts. Patient has done this before according to the  where there was an negative work-up. Chief Complaint   Patient presents with    Cough       Review of Systems   Pertinent stated in the HPI above  Physical Exam  Vitals reviewed. Constitutional:       General: She is not in acute distress. Appearance: She is not ill-appearing. HENT:      Head: Normocephalic. Right Ear: External ear normal.      Left Ear: External ear normal.      Nose: Nose normal.      Mouth/Throat:      Mouth: Mucous membranes are moist.   Eyes:      General:         Right eye: No discharge. Left eye: No discharge. Conjunctiva/sclera: Conjunctivae normal.   Cardiovascular:      Rate and Rhythm: Normal rate and regular rhythm. Heart sounds: No friction rub. No gallop. Pulmonary:      Effort: No respiratory distress. Breath sounds: No stridor. Abdominal:      General: There is no distension. Tenderness: There is no abdominal tenderness. There is no guarding or rebound. Musculoskeletal:         General: No deformity or signs of injury. Cervical back: Normal range of motion and neck supple. No rigidity or tenderness. Skin:     Coloration: Skin is not jaundiced. Neurological:      Mental Status: She is alert.       Sensory: No Pt is medically cleared for dc to home today. Pt refusing to go to [facility] but is agreeable to [facility] services. SW met with pt to discuss dc needs. Demographics, insurance and PCP confirmed. No preference of [facility] agency. Referral submitted to [facility] [place] for RN & PT services. No other dc needs or concerns identified or reported at present. SW remains available to assist as needed. Care Management Interventions  PCP Verified by CM: Yes  Last Visit to PCP: 01/31/20  Mode of Transport at Discharge: Other (see comment) (family)  Transition of Care Consult (CM Consult): [address]: Yes  Discharge Durable Medical Equipment: No  Physical Therapy Consult: Yes  Occupational Therapy Consult: No  Speech Therapy Consult: No  Support Systems: Other Family Member(s)  Confirm Follow Up Transport: Self  The Plan for Transition of Care is Related to the Following Treatment Goals : Home health nursing and PT services to improve pt's strength and functional abilities.   The Patient and/or Patient Representative was Provided with a Choice of Provider and Agrees with the Discharge Plan?: Yes  Freedom of Choice List was Provided with Basic Dialogue that Supports the Patient's Individualized Plan of Care/Goals, Treatment Preferences and Shares the Quality Data Associated with the Providers?: Yes  Discharge Location  Discharge Placement: Home with [facility]) sensory deficit. Motor: No weakness. Psychiatric:         Mood and Affect: Mood normal.         Behavior: Behavior normal.        Procedures     MDM     65year-old with history of general anxiety disorder and depression presents emerged department for concerns of cough. Patient she feels like her throat is closing. She is currently asymptomatic. She denies having cough, chest pain, shortness of breath, abdominal pain, nausea, vomiting, sore throat, congestion, vaginal discharge, GI bleed. She is alert to herself to place but not month and year. I spoke with the patient's  over the phone who told me that the patient is normally uncooperative and has psychiatric issues that is being followed up with her primary care physician. There are no reports of new rashes new food, new antibiotics sick contacts. Patient has done this before according to the  where there was an negative work-up. Upon entering the room the patient is hemodynamically stable she is able to speak in full sentences she is not been experiencing any pruritus and is not scratching herself. She is sitting well comfortably. She complains that her throat is closing. Upon my examination I personally do not appreciate any oral edema however on reexamination with my attending there is slight oral posterior pharynx edema and the patient is has a slight erythematous of the face the chest and the upper extremity. Her heart is regular rate and rhythm lungs are clear to auscultation no wheezing crackles rhonchi heard, abdomen is soft and nontender. Patient is given allergic treatment such as Pepcid 20 mg, Solu-Medrol 125 mg, Vistaril 25 mg, DuoNeb 1 amp which improved the patient's physical signs however the patient did say she is throat is still feeling as if she is closing. She is able to pass the p.o. challenge without any difficulty.   There is no submandibular swelling no swelling under the sublingual, no dental abscess no tonsil abscess no uvula irregularities/deviation. Lab work is unremarkable for infectious counts nor anemic counts and patient's BMP is normal.  Her CT scan of the soft tissue neck was done to evaluate for any abscess however the image was read no acute abnormalities of the neck. Her chest x-ray was to evaluate any acute cardiopulmonary process such as effusions consolidations or pneumothorax which were all negative. Labs and imaging were discussed with the patient and the patient's  who agreed to take the patient back home and to do a work-up and contact her primary care physician for further follow-up. My impression is patient may have had an allergic reaction although she is unable to tell whether or not she ate, dress, or when outside of touch something new. And she is not actively paretic. She was still given an allergic cocktail medicine with some improvement in her physical symptoms of redness and oral posterior edema. I considered anaphylaxis however the patient did not have additional signs other than the skin involvement there is no abdominal pain no nausea no vomiting no wheezing patient was not hypotensive. There is no need for the patient to be given epinephrine. I considered Elihu Breda angina however the patient is afebrile no white blood cell count there is no submandibular swelling and there is no hard end feel of the sublingual.  Patient denies having a sore throat as well. I considered epiglottitis however the patient is resting comfortably and is in no acute respiratory distress she is not drooling there is no excessive secretions. Also CT scan of the soft tissue neck is negative for acute process. ED Course as of 02/19/23 0817   Sun Feb 19, 2023   1214 120 112/30 for lower EXTR \"reviewed with CTA pulmonary with contrast  1. There is no pulmonary embolus  2. There is no appreciable right or left pulmonary mass and there is no  endobronchial lesion  3.  Centrilobular emphysematous changes  4. Mild bibasilar consolidation favored to represent atelectasis. The  appearance is much less likely to represent pneumonia  5. Mild cardiomegaly. There is no pericardial effusion.     [MN]   0688 I spoke with patient's  over the phone. He confirmed that the patient has mental problems. He said the patient is normally not cooperative. He says the patient has done this before where there is nothing wrong. He said the patient woke up and said his throat is closing she did not have any new foods no new antibiotics no noted rashes. [MN]   4901 On exam oropharynx is in tenderness face and upper extremity are not red. She is not actively itching however she is complaining about not be able to breathe. She says her throat is closing. Patient given steroids, DuoNeb Vistaril and Pepcid treatment [MN]      ED Course User Index  [MN] Timothy Theresa, DO        ED Course as of 02/19/23 2118   Sun Feb 19, 2023   9722 120 112/30 for lower EXTR \"reviewed with CTA pulmonary with contrast  1. There is no pulmonary embolus  2. There is no appreciable right or left pulmonary mass and there is no  endobronchial lesion  3. Centrilobular emphysematous changes  4. Mild bibasilar consolidation favored to represent atelectasis. The  appearance is much less likely to represent pneumonia  5. Mild cardiomegaly. There is no pericardial effusion.     [MN]   0619 I spoke with patient's  over the phone. He confirmed that the patient has mental problems. He said the patient is normally not cooperative. He says the patient has done this before where there is nothing wrong. He said the patient woke up and said his throat is closing she did not have any new foods no new antibiotics no noted rashes. [MN]   9119 On exam oropharynx is in tenderness face and upper extremity are not red. She is not actively itching however she is complaining about not be able to breathe. She says her throat is closing. Patient given steroids, DuoNeb Vistaril and Pepcid treatment [MN]      ED Course User Index  [MN] Cuca Mclaughlin, DO       --------------------------------------------- PAST HISTORY ---------------------------------------------  Past Medical History:  has a past medical history of Abdominal pain, Anxiety, Arthritis, Blood circulation, collateral, Cancer (HCC), Chronic back pain, Colitis, Complex regional pain syndrome type 1 of right lower extremity, COPD (chronic obstructive pulmonary disease) ([facility] [address] Depression, GERD (gastroesophageal reflux disease), Headache(784.0), Hyperlipidemia, Kidney stone, Neuromuscular disorder ([facility] [address] On home O2, Osteoarthritis, Other disorders of kidney and ureter in diseases classified elsewhere, Pneumonia, Psychiatric problem, and RSD (reflex sympathetic dystrophy). Past Surgical History:  has a past surgical history that includes Hysterectomy; laminectomy; Finger amputation; back surgery (2005); other surgical history (09/18/2013); other surgical history (N/A, 02/03/2014); Endoscopy, colon, diagnostic; Carpal tunnel release; Colonoscopy; other surgical history (Right, 11/11/2014); other surgical history (04/08/2015); Nerve Block (N/A, 08/19/2015); Nerve Block (Left, 08/26/2015); Nerve Block (09/02/2015); Nerve Block (Right, 09/17/2015); other surgical history (04/25/2018); pr revj/rmvl implanted spinal neurostim generator (N/A, 04/25/2018); and Upper gastrointestinal endoscopy (N/A, 1/13/2023). Social History:  reports that she quit smoking about 3 months ago. Her smoking use included cigarettes. She started smoking about 53 years ago. She has a 21.00 pack-year smoking history. She has been exposed to tobacco smoke. She has never used smokeless tobacco. She reports that she does not drink alcohol and does not use drugs. Family History: family history includes Other in her mother. The patients home medications have been reviewed.     Allergies: Sulfamethazine and Ancef [cefazolin sodium]    -------------------------------------------------- RESULTS -------------------------------------------------  Labs:  Results for orders placed or performed during the hospital encounter of 02/19/23   CBC with Auto Differential   Result Value Ref Range    WBC 11.3 4.5 - 11.5 E9/L    RBC 4.45 3.50 - 5.50 E12/L    Hemoglobin 13.1 11.5 - 15.5 g/dL    Hematocrit 40.4 34.0 - 48.0 %    MCV 90.8 80.0 - 99.9 fL    MCH 29.4 26.0 - 35.0 pg    MCHC 32.4 32.0 - 34.5 %    RDW 13.6 11.5 - 15.0 fL    Platelets 159 262 - 770 E9/L    MPV 10.1 7.0 - 12.0 fL    Neutrophils % 70.9 43.0 - 80.0 %    Immature Granulocytes % 1.2 0.0 - 5.0 %    Lymphocytes % 15.8 (L) 20.0 - 42.0 %    Monocytes % 9.4 2.0 - 12.0 %    Eosinophils % 2.1 0.0 - 6.0 %    Basophils % 0.6 0.0 - 2.0 %    Neutrophils Absolute 7.98 (H) 1.80 - 7.30 E9/L    Immature Granulocytes # 0.14 E9/L    Lymphocytes Absolute 1.78 1.50 - 4.00 E9/L    Monocytes Absolute 1.06 (H) 0.10 - 0.95 E9/L    Eosinophils Absolute 0.24 0.05 - 0.50 E9/L    Basophils Absolute 0.07 0.00 - 0.20 E9/L   Comprehensive Metabolic Panel   Result Value Ref Range    Sodium 140 132 - 146 mmol/L    Potassium 4.6 3.5 - 5.0 mmol/L    Chloride 101 98 - 107 mmol/L    CO2 29 22 - 29 mmol/L    Anion Gap 10 7 - 16 mmol/L    Glucose 106 (H) 74 - 99 mg/dL    BUN 12 6 - 23 mg/dL    Creatinine 0.5 0.5 - 1.0 mg/dL    Est, Glom Filt Rate >60 >=60 mL/min/1.73    Calcium 9.6 8.6 - 10.2 mg/dL    Total Protein 7.2 6.4 - 8.3 g/dL    Albumin 3.9 3.5 - 5.2 g/dL    Total Bilirubin 0.3 0.0 - 1.2 mg/dL    Alkaline Phosphatase 117 (H) 35 - 104 U/L    ALT 22 0 - 32 U/L    AST 27 0 - 31 U/L       Radiology:  CT SOFT TISSUE NECK W CONTRAST   Final Result   No acute abnormality of the soft tissue structures of the neck. RECOMMENDATIONS:   Unavailable         XR CHEST PORTABLE   Final Result   COPD.   No findings of failure or pneumonia      Emphysematous changes      Spinal stimulators are seen at the level of the cervical spine and inferior   thoracic spine.             ------------------------- NURSING NOTES AND VITALS REVIEWED ---------------------------  Date / Time Roomed:  2/19/2023  6:13 AM  ED Bed Assignment:  ROMIE/ROMIE    The nursing notes within the ED encounter and vital signs as below have been reviewed. /69   Pulse 82   Temp 98.6 °F (37 °C) (Oral)   Resp 16   Wt 118 lb 4.8 oz (53.7 kg)   LMP  (LMP Unknown)   SpO2 94%   BMI 21.64 kg/m²   Oxygen Saturation Interpretation: Normal      ------------------------------------------ PROGRESS NOTES ------------------------------------------  I have spoken with the patient and discussed todays results, in addition to providing specific details for the plan of care and counseling regarding the diagnosis and prognosis. Their questions are answered at this time and they are agreeable with the plan. I discussed at length with them reasons for immediate return here for re evaluation. They will followup with primary care by calling their office tomorrow. --------------------------------- ADDITIONAL PROVIDER NOTES ---------------------------------  At this time the patient is without objective evidence of an acute process requiring hospitalization or inpatient management. They have remained hemodynamically stable throughout their entire ED visit and are stable for discharge with outpatient follow-up. The plan has been discussed in detail and they are aware of the specific conditions for emergent return, as well as the importance of follow-up. Discharge Medication List as of 2/19/2023  2:02 PM          Diagnosis:  1. Cough, unspecified type Controlled   2. Allergy, initial encounter        Disposition:  Patient's disposition: Discharge to home  Patient's condition is stable. Attending was present and available throughout encounter including all critical portions;  See Attending Note/Attestation for Final Plan       Maite Polk, DO  Resident  02/19/23 6406

## 2023-06-27 ENCOUNTER — OFFICE VISIT (OUTPATIENT)
Dept: FAMILY MEDICINE CLINIC | Facility: CLINIC | Age: 63
End: 2023-06-27
Payer: COMMERCIAL

## 2023-06-27 VITALS
BODY MASS INDEX: 24.05 KG/M2 | HEART RATE: 96 BPM | DIASTOLIC BLOOD PRESSURE: 68 MMHG | OXYGEN SATURATION: 98 % | HEIGHT: 70 IN | SYSTOLIC BLOOD PRESSURE: 124 MMHG | TEMPERATURE: 97 F | WEIGHT: 168 LBS

## 2023-06-27 DIAGNOSIS — R21 RASH OF GROIN: ICD-10-CM

## 2023-06-27 DIAGNOSIS — I73.9 PERIPHERAL VASCULAR DISEASE, UNSPECIFIED (HCC): ICD-10-CM

## 2023-06-27 DIAGNOSIS — D64.9 ANEMIA, UNSPECIFIED TYPE: ICD-10-CM

## 2023-06-27 DIAGNOSIS — I10 ESSENTIAL HYPERTENSION, BENIGN: ICD-10-CM

## 2023-06-27 DIAGNOSIS — E78.2 MIXED HYPERLIPIDEMIA: ICD-10-CM

## 2023-06-27 DIAGNOSIS — Z72.0 TOBACCO ABUSE: ICD-10-CM

## 2023-06-27 DIAGNOSIS — I25.10 CORONARY ARTERY DISEASE INVOLVING NATIVE HEART WITHOUT ANGINA PECTORIS, UNSPECIFIED VESSEL OR LESION TYPE: ICD-10-CM

## 2023-06-27 DIAGNOSIS — R01.1 SYSTOLIC MURMUR: Primary | ICD-10-CM

## 2023-06-27 LAB
BASOPHILS # BLD: 0.1 K/UL (ref 0–0.2)
BASOPHILS NFR BLD: 1 % (ref 0–2)
DIFFERENTIAL METHOD BLD: ABNORMAL
EOSINOPHIL # BLD: 0.3 K/UL (ref 0–0.8)
EOSINOPHIL NFR BLD: 2 % (ref 0.5–7.8)
ERYTHROCYTE [DISTWIDTH] IN BLOOD BY AUTOMATED COUNT: 16.9 % (ref 11.9–14.6)
HCT VFR BLD AUTO: 42.1 % (ref 41.1–50.3)
HGB BLD-MCNC: 13.5 G/DL (ref 13.6–17.2)
IMM GRANULOCYTES # BLD AUTO: 0.1 K/UL (ref 0–0.5)
IMM GRANULOCYTES NFR BLD AUTO: 0 % (ref 0–5)
LYMPHOCYTES # BLD: 2 K/UL (ref 0.5–4.6)
LYMPHOCYTES NFR BLD: 17 % (ref 13–44)
MCH RBC QN AUTO: 29 PG (ref 26.1–32.9)
MCHC RBC AUTO-ENTMCNC: 32.1 G/DL (ref 31.4–35)
MCV RBC AUTO: 90.3 FL (ref 82–102)
MONOCYTES # BLD: 0.9 K/UL (ref 0.1–1.3)
MONOCYTES NFR BLD: 8 % (ref 4–12)
NEUTS SEG # BLD: 8.6 K/UL (ref 1.7–8.2)
NEUTS SEG NFR BLD: 72 % (ref 43–78)
NRBC # BLD: 0 K/UL (ref 0–0.2)
PLATELET # BLD AUTO: 231 K/UL (ref 150–450)
PMV BLD AUTO: 10.9 FL (ref 9.4–12.3)
RBC # BLD AUTO: 4.66 M/UL (ref 4.23–5.6)
WBC # BLD AUTO: 11.9 K/UL (ref 4.3–11.1)

## 2023-06-27 PROCEDURE — 3078F DIAST BP <80 MM HG: CPT | Performed by: STUDENT IN AN ORGANIZED HEALTH CARE EDUCATION/TRAINING PROGRAM

## 2023-06-27 PROCEDURE — 3074F SYST BP LT 130 MM HG: CPT | Performed by: STUDENT IN AN ORGANIZED HEALTH CARE EDUCATION/TRAINING PROGRAM

## 2023-06-27 PROCEDURE — 99214 OFFICE O/P EST MOD 30 MIN: CPT | Performed by: STUDENT IN AN ORGANIZED HEALTH CARE EDUCATION/TRAINING PROGRAM

## 2023-06-27 RX ORDER — ATORVASTATIN CALCIUM 80 MG/1
80 TABLET, FILM COATED ORAL DAILY
Qty: 90 TABLET | Refills: 3 | Status: SHIPPED | OUTPATIENT
Start: 2023-06-27

## 2023-06-27 RX ORDER — PANTOPRAZOLE SODIUM 40 MG/1
40 TABLET, DELAYED RELEASE ORAL DAILY
Qty: 90 TABLET | Refills: 3 | Status: SHIPPED | OUTPATIENT
Start: 2023-06-27

## 2023-06-27 RX ORDER — GABAPENTIN 100 MG/1
200 CAPSULE ORAL 3 TIMES DAILY
Qty: 180 CAPSULE | Refills: 11 | Status: SHIPPED | OUTPATIENT
Start: 2023-06-27 | End: 2024-06-21

## 2023-06-27 RX ORDER — TRAZODONE HYDROCHLORIDE 100 MG/1
100 TABLET ORAL NIGHTLY
Qty: 90 TABLET | Refills: 3 | Status: SHIPPED | OUTPATIENT
Start: 2023-06-27

## 2023-06-27 RX ORDER — LISINOPRIL 2.5 MG/1
2.5 TABLET ORAL DAILY
Qty: 90 TABLET | Refills: 3 | Status: SHIPPED | OUTPATIENT
Start: 2023-06-27

## 2023-06-27 RX ORDER — NYSTATIN 100000 U/G
CREAM TOPICAL
Qty: 30 G | Refills: 0 | Status: SHIPPED | OUTPATIENT
Start: 2023-06-27

## 2023-06-27 SDOH — ECONOMIC STABILITY: FOOD INSECURITY: WITHIN THE PAST 12 MONTHS, THE FOOD YOU BOUGHT JUST DIDN'T LAST AND YOU DIDN'T HAVE MONEY TO GET MORE.: NEVER TRUE

## 2023-06-27 SDOH — ECONOMIC STABILITY: HOUSING INSECURITY
IN THE LAST 12 MONTHS, WAS THERE A TIME WHEN YOU DID NOT HAVE A STEADY PLACE TO SLEEP OR SLEPT IN A SHELTER (INCLUDING NOW)?: NO

## 2023-06-27 SDOH — ECONOMIC STABILITY: FOOD INSECURITY: WITHIN THE PAST 12 MONTHS, YOU WORRIED THAT YOUR FOOD WOULD RUN OUT BEFORE YOU GOT MONEY TO BUY MORE.: NEVER TRUE

## 2023-06-27 SDOH — ECONOMIC STABILITY: INCOME INSECURITY: HOW HARD IS IT FOR YOU TO PAY FOR THE VERY BASICS LIKE FOOD, HOUSING, MEDICAL CARE, AND HEATING?: NOT HARD AT ALL

## 2023-06-27 ASSESSMENT — PATIENT HEALTH QUESTIONNAIRE - PHQ9
SUM OF ALL RESPONSES TO PHQ QUESTIONS 1-9: 0
2. FEELING DOWN, DEPRESSED OR HOPELESS: 0
SUM OF ALL RESPONSES TO PHQ9 QUESTIONS 1 & 2: 0
SUM OF ALL RESPONSES TO PHQ QUESTIONS 1-9: 0
1. LITTLE INTEREST OR PLEASURE IN DOING THINGS: 0

## 2023-06-27 ASSESSMENT — ENCOUNTER SYMPTOMS: BLOOD IN STOOL: 0

## 2023-06-28 LAB
ALBUMIN SERPL-MCNC: 3.4 G/DL (ref 3.2–4.6)
ALBUMIN/GLOB SERPL: 1.1 (ref 0.4–1.6)
ALP SERPL-CCNC: 97 U/L (ref 50–136)
ALT SERPL-CCNC: 24 U/L (ref 12–65)
ANION GAP SERPL CALC-SCNC: 4 MMOL/L (ref 2–11)
AST SERPL-CCNC: 11 U/L (ref 15–37)
BILIRUB SERPL-MCNC: 0.3 MG/DL (ref 0.2–1.1)
BUN SERPL-MCNC: 16 MG/DL (ref 8–23)
CALCIUM SERPL-MCNC: 8.9 MG/DL (ref 8.3–10.4)
CHLORIDE SERPL-SCNC: 108 MMOL/L (ref 101–110)
CHOLEST SERPL-MCNC: 132 MG/DL
CO2 SERPL-SCNC: 25 MMOL/L (ref 21–32)
CREAT SERPL-MCNC: 1.2 MG/DL (ref 0.8–1.5)
GLOBULIN SER CALC-MCNC: 3.1 G/DL (ref 2.8–4.5)
GLUCOSE SERPL-MCNC: 89 MG/DL (ref 65–100)
HDLC SERPL-MCNC: 59 MG/DL (ref 40–60)
HDLC SERPL: 2.2
LDLC SERPL CALC-MCNC: 57.6 MG/DL
POTASSIUM SERPL-SCNC: 4.1 MMOL/L (ref 3.5–5.1)
PROT SERPL-MCNC: 6.5 G/DL (ref 6.3–8.2)
SODIUM SERPL-SCNC: 137 MMOL/L (ref 133–143)
TRIGL SERPL-MCNC: 77 MG/DL (ref 35–150)
VLDLC SERPL CALC-MCNC: 15.4 MG/DL (ref 6–23)

## 2024-01-16 ENCOUNTER — OFFICE VISIT (OUTPATIENT)
Dept: FAMILY MEDICINE CLINIC | Facility: CLINIC | Age: 64
End: 2024-01-16
Payer: COMMERCIAL

## 2024-01-16 VITALS
DIASTOLIC BLOOD PRESSURE: 80 MMHG | SYSTOLIC BLOOD PRESSURE: 142 MMHG | HEART RATE: 88 BPM | TEMPERATURE: 97 F | BODY MASS INDEX: 24.2 KG/M2 | OXYGEN SATURATION: 94 % | WEIGHT: 169 LBS | HEIGHT: 70 IN

## 2024-01-16 DIAGNOSIS — E78.2 MIXED HYPERLIPIDEMIA: ICD-10-CM

## 2024-01-16 DIAGNOSIS — I10 ESSENTIAL HYPERTENSION, BENIGN: ICD-10-CM

## 2024-01-16 DIAGNOSIS — Z23 NEED FOR INFLUENZA VACCINATION: ICD-10-CM

## 2024-01-16 DIAGNOSIS — Z12.5 PROSTATE CANCER SCREENING: ICD-10-CM

## 2024-01-16 DIAGNOSIS — I73.9 PERIPHERAL VASCULAR DISEASE, UNSPECIFIED (HCC): ICD-10-CM

## 2024-01-16 DIAGNOSIS — Z12.11 COLON CANCER SCREENING: ICD-10-CM

## 2024-01-16 DIAGNOSIS — G47.00 INSOMNIA, UNSPECIFIED TYPE: ICD-10-CM

## 2024-01-16 DIAGNOSIS — R01.1 SYSTOLIC MURMUR: ICD-10-CM

## 2024-01-16 DIAGNOSIS — I10 ESSENTIAL HYPERTENSION, BENIGN: Primary | ICD-10-CM

## 2024-01-16 DIAGNOSIS — Z72.0 TOBACCO ABUSE: ICD-10-CM

## 2024-01-16 LAB
ALBUMIN SERPL-MCNC: 3.8 G/DL (ref 3.2–4.6)
ALBUMIN/GLOB SERPL: 1.3 (ref 0.4–1.6)
ALP SERPL-CCNC: 95 U/L (ref 50–136)
ANION GAP SERPL CALC-SCNC: 3 MMOL/L (ref 2–11)
AST SERPL-CCNC: 21 U/L (ref 15–37)
BASOPHILS # BLD: 0.1 K/UL (ref 0–0.2)
BASOPHILS NFR BLD: 1 % (ref 0–2)
BILIRUB SERPL-MCNC: 0.4 MG/DL (ref 0.2–1.1)
BUN SERPL-MCNC: 16 MG/DL (ref 8–23)
CALCIUM SERPL-MCNC: 9.3 MG/DL (ref 8.3–10.4)
CHLORIDE SERPL-SCNC: 105 MMOL/L (ref 103–113)
CHOLEST SERPL-MCNC: 136 MG/DL
CO2 SERPL-SCNC: 28 MMOL/L (ref 21–32)
CREAT SERPL-MCNC: 1.1 MG/DL (ref 0.8–1.5)
DIFFERENTIAL METHOD BLD: ABNORMAL
EOSINOPHIL # BLD: 0.2 K/UL (ref 0–0.8)
EOSINOPHIL NFR BLD: 2 % (ref 0.5–7.8)
ERYTHROCYTE [DISTWIDTH] IN BLOOD BY AUTOMATED COUNT: 16.1 % (ref 11.9–14.6)
GLOBULIN SER CALC-MCNC: 3 G/DL (ref 2.8–4.5)
GLUCOSE SERPL-MCNC: 94 MG/DL (ref 65–100)
HCT VFR BLD AUTO: 43.9 % (ref 41.1–50.3)
HDLC SERPL-MCNC: 57 MG/DL (ref 40–60)
HDLC SERPL: 2.4
HGB BLD-MCNC: 14.3 G/DL (ref 13.6–17.2)
IMM GRANULOCYTES # BLD AUTO: 0.1 K/UL (ref 0–0.5)
IMM GRANULOCYTES NFR BLD AUTO: 1 % (ref 0–5)
LDLC SERPL CALC-MCNC: 58.8 MG/DL
LYMPHOCYTES # BLD: 2 K/UL (ref 0.5–4.6)
LYMPHOCYTES NFR BLD: 17 % (ref 13–44)
MCH RBC QN AUTO: 29.5 PG (ref 26.1–32.9)
MCHC RBC AUTO-ENTMCNC: 32.6 G/DL (ref 31.4–35)
MCV RBC AUTO: 90.5 FL (ref 82–102)
MONOCYTES # BLD: 0.9 K/UL (ref 0.1–1.3)
MONOCYTES NFR BLD: 7 % (ref 4–12)
NEUTS SEG # BLD: 8.7 K/UL (ref 1.7–8.2)
NEUTS SEG NFR BLD: 72 % (ref 43–78)
NRBC # BLD: 0 K/UL (ref 0–0.2)
PLATELET # BLD AUTO: 213 K/UL (ref 150–450)
PMV BLD AUTO: 11 FL (ref 9.4–12.3)
POTASSIUM SERPL-SCNC: 4 MMOL/L (ref 3.5–5.1)
PROT SERPL-MCNC: 6.8 G/DL (ref 6.3–8.2)
RBC # BLD AUTO: 4.85 M/UL (ref 4.23–5.6)
SODIUM SERPL-SCNC: 136 MMOL/L (ref 136–146)
TRIGL SERPL-MCNC: 101 MG/DL (ref 35–150)
VLDLC SERPL CALC-MCNC: 20.2 MG/DL (ref 6–23)
WBC # BLD AUTO: 12 K/UL (ref 4.3–11.1)

## 2024-01-16 PROCEDURE — 3079F DIAST BP 80-89 MM HG: CPT | Performed by: STUDENT IN AN ORGANIZED HEALTH CARE EDUCATION/TRAINING PROGRAM

## 2024-01-16 PROCEDURE — 3077F SYST BP >= 140 MM HG: CPT | Performed by: STUDENT IN AN ORGANIZED HEALTH CARE EDUCATION/TRAINING PROGRAM

## 2024-01-16 PROCEDURE — 99214 OFFICE O/P EST MOD 30 MIN: CPT | Performed by: STUDENT IN AN ORGANIZED HEALTH CARE EDUCATION/TRAINING PROGRAM

## 2024-01-16 RX ORDER — PANTOPRAZOLE SODIUM 40 MG/1
40 TABLET, DELAYED RELEASE ORAL DAILY
Qty: 90 TABLET | Refills: 3 | Status: SHIPPED | OUTPATIENT
Start: 2024-01-16

## 2024-01-16 RX ORDER — NYSTATIN 100000 U/G
CREAM TOPICAL
Qty: 30 G | Refills: 5 | Status: SHIPPED | OUTPATIENT
Start: 2024-01-16

## 2024-01-16 RX ORDER — GABAPENTIN 100 MG/1
200 CAPSULE ORAL 3 TIMES DAILY
Qty: 180 CAPSULE | Refills: 11 | Status: SHIPPED | OUTPATIENT
Start: 2024-01-16 | End: 2025-01-10

## 2024-01-16 RX ORDER — ATORVASTATIN CALCIUM 80 MG/1
80 TABLET, FILM COATED ORAL DAILY
Qty: 90 TABLET | Refills: 3 | Status: SHIPPED | OUTPATIENT
Start: 2024-01-16

## 2024-01-16 RX ORDER — TRAZODONE HYDROCHLORIDE 100 MG/1
100 TABLET ORAL NIGHTLY
Qty: 90 TABLET | Refills: 3 | Status: SHIPPED | OUTPATIENT
Start: 2024-01-16

## 2024-01-16 RX ORDER — LISINOPRIL 2.5 MG/1
2.5 TABLET ORAL DAILY
Qty: 90 TABLET | Refills: 3 | Status: SHIPPED | OUTPATIENT
Start: 2024-01-16

## 2024-01-16 NOTE — PROGRESS NOTES
[facility]  [address]  [place] [address]  Phone [phone]  Fax:  [phone]    Rakesh Yanez (:  1960) is a 63 y.o. male here for evaluation of the following chief complaint(s):  Insomnia, Gastroesophageal Reflux, Hypertension, and Cholesterol Problem (Refills/No labs)       ASSESSMENT/PLAN:  1. Essential hypertension, benign  -     CBC with Auto Differential; Future  -     Comprehensive Metabolic Panel; Future  2. Colon cancer screening  -     POCT FECAL IMMUNOCHEMICAL TEST (FIT) ()  3. Peripheral vascular disease, unspecified (HCC)  -     Influenza, FLUAD, (age 65 y+), IM, Preservative Free, 0.5 mL  4. Mixed hyperlipidemia  -     Lipid Panel; Future  5. Tobacco abuse  -     Influenza, FLUAD, (age 65 y+), IM, Preservative Free, 0.5 mL  6. Prostate cancer screening  -     PSA Screening; Future  7. Need for influenza vaccination  -     Influenza, FLUAD, (age 65 y+), IM, Preservative Free, 0.5 mL  8. Systolic murmur  9. Insomnia, unspecified type    Blood pressure mildly elevated, continue low-dose lisinopril and monitor BP at home. Continue Lipitor for HLD/CAD/PAD.  Check CMP, CBC, and lipid panel.  Continue trazodone for insomnia.    Patient has systolic murmur.  Has been unable to get echo.  Will try to get this done after patient gets new insurance.    Followed by vascular surgery for PAD, history of right AKA.  On aspirin 81 and high-dose statin.  Continues to smoke about 1 pack/day.  Discussed the need for patient to quit smoking especially given his medical history but he states he is not ready to quit.  Has tried Wellbutrin and patches without success.  Declines LDCT lung cancer screening.  Negative FIT 2023.  Recommended colonoscopy or Cologuard for colon cancer screening but patient elects for yearly FIT.  Advised patient to get Prevnar 20 vaccine.  Check screening PSA.      Return in about 6 months (around 2024) for routine f/u.         Subjective

## 2024-01-17 LAB — PSA SERPL-MCNC: 1 NG/ML

## 2024-01-24 ENCOUNTER — TELEPHONE (OUTPATIENT)
Dept: FAMILY MEDICINE CLINIC | Facility: CLINIC | Age: 64
End: 2024-01-24

## 2024-01-24 LAB
HEMOCCULT STL QL: POSITIVE
VALID INTERNAL CONTROL: YES

## 2024-07-16 ENCOUNTER — OFFICE VISIT (OUTPATIENT)
Dept: FAMILY MEDICINE CLINIC | Facility: CLINIC | Age: 64
End: 2024-07-16
Payer: MEDICARE

## 2024-07-16 DIAGNOSIS — E78.2 MIXED HYPERLIPIDEMIA: ICD-10-CM

## 2024-07-16 DIAGNOSIS — I10 ESSENTIAL HYPERTENSION, BENIGN: Primary | ICD-10-CM

## 2024-07-16 DIAGNOSIS — R19.5 POSITIVE FIT (FECAL IMMUNOCHEMICAL TEST): ICD-10-CM

## 2024-07-16 DIAGNOSIS — Z72.0 TOBACCO ABUSE: ICD-10-CM

## 2024-07-16 DIAGNOSIS — I73.9 PERIPHERAL VASCULAR DISEASE, UNSPECIFIED (HCC): ICD-10-CM

## 2024-07-16 DIAGNOSIS — G47.00 INSOMNIA, UNSPECIFIED TYPE: ICD-10-CM

## 2024-07-16 PROCEDURE — G2211 COMPLEX E/M VISIT ADD ON: HCPCS | Performed by: STUDENT IN AN ORGANIZED HEALTH CARE EDUCATION/TRAINING PROGRAM

## 2024-07-16 PROCEDURE — 99214 OFFICE O/P EST MOD 30 MIN: CPT | Performed by: STUDENT IN AN ORGANIZED HEALTH CARE EDUCATION/TRAINING PROGRAM

## 2024-07-16 PROCEDURE — 3075F SYST BP GE 130 - 139MM HG: CPT | Performed by: STUDENT IN AN ORGANIZED HEALTH CARE EDUCATION/TRAINING PROGRAM

## 2024-07-16 PROCEDURE — G0328 FECAL BLOOD SCRN IMMUNOASSAY: HCPCS | Performed by: STUDENT IN AN ORGANIZED HEALTH CARE EDUCATION/TRAINING PROGRAM

## 2024-07-16 PROCEDURE — 3079F DIAST BP 80-89 MM HG: CPT | Performed by: STUDENT IN AN ORGANIZED HEALTH CARE EDUCATION/TRAINING PROGRAM

## 2024-07-17 VITALS
SYSTOLIC BLOOD PRESSURE: 132 MMHG | TEMPERATURE: 98 F | DIASTOLIC BLOOD PRESSURE: 86 MMHG | OXYGEN SATURATION: 98 % | HEIGHT: 70 IN | BODY MASS INDEX: 23.91 KG/M2 | HEART RATE: 87 BPM | WEIGHT: 167 LBS

## 2024-07-17 NOTE — PROGRESS NOTES
[facility]  [address] [place] [address]  Phone [phone]  Fax:  [phone]    Rakesh Yanez (:  1960) is a 63 y.o. male here for evaluation of the following chief complaint(s):  Medication Refill (Needs labs), Hypertension, Gastroesophageal Reflux, Insomnia, and Cholesterol Problem    Assessment & Plan   ASSESSMENT/PLAN:  1. Essential hypertension, benign  -     Comprehensive Metabolic Panel; Future  -     CBC with Auto Differential; Future  2. Mixed hyperlipidemia  -     Lipid Panel; Future  3. Positive FIT (fecal immunochemical test)  -     POCT FECAL IMMUNOCHEMICAL TEST (FIT) (); Future  4. Tobacco abuse  5. Peripheral vascular disease, unspecified (HCC)  6. Insomnia, unspecified type    Blood pressure controlled, continue low-dose lisinopril. Continue Lipitor for HLD/CAD/PAD.  Check CMP, CBC, and lipid panel.  Continue trazodone for insomnia.     Patient has systolic murmur.  Echo previously ordered but patient never got this done.    Positive FIT 2024, advised patient to get colonoscopy at that time but he refused.  He wants to recheck FIT to see if still positive.     Followed by vascular surgery for PAD, history of right AKA.  On aspirin 81 and high-dose statin.  Continues to smoke about 1 pack/day.  Have discussed the need for patient to quit smoking especially given his medical history but he is not ready to quit.  Has tried Wellbutrin and patches without success.  Declines LDCT lung cancer screening.  Have advised patient to get Prevnar 20 vaccine.  Normal screening PSA 2024.    Return in about 6 months (around 2025) for routine f/u.         Subjective   SUBJECTIVE/OBJECTIVE:  HPI  63-year-old male with PMH of CAD, PVD, COPD, HTN, HLD, history of DVT/PE, fatty liver, and gastric ulcer who presents for regular 6-month follow-up of chronic medical problems.   -Doing well, no concerns  -Denies melena/hematochezia or constipation    Review of Systems

## 2024-07-22 DIAGNOSIS — E78.2 MIXED HYPERLIPIDEMIA: ICD-10-CM

## 2024-07-22 DIAGNOSIS — I10 ESSENTIAL HYPERTENSION, BENIGN: ICD-10-CM

## 2024-07-22 LAB
ALBUMIN SERPL-MCNC: 3.5 G/DL (ref 3.2–4.6)
ALBUMIN/GLOB SERPL: 1.3 (ref 1–1.9)
ALP SERPL-CCNC: 109 U/L (ref 40–129)
ALT SERPL-CCNC: 26 U/L (ref 12–65)
ANION GAP SERPL CALC-SCNC: 11 MMOL/L (ref 9–18)
AST SERPL-CCNC: 26 U/L (ref 15–37)
BASOPHILS # BLD: 0.1 K/UL (ref 0–0.2)
BASOPHILS NFR BLD: 1 % (ref 0–2)
BILIRUB SERPL-MCNC: 0.3 MG/DL (ref 0–1.2)
BUN SERPL-MCNC: 11 MG/DL (ref 8–23)
CALCIUM SERPL-MCNC: 9.2 MG/DL (ref 8.8–10.2)
CHLORIDE SERPL-SCNC: 104 MMOL/L (ref 98–107)
CHOLEST SERPL-MCNC: 120 MG/DL (ref 0–200)
CO2 SERPL-SCNC: 25 MMOL/L (ref 20–28)
CREAT SERPL-MCNC: 1.11 MG/DL (ref 0.8–1.3)
DIFFERENTIAL METHOD BLD: ABNORMAL
EOSINOPHIL # BLD: 0.3 K/UL (ref 0–0.8)
EOSINOPHIL NFR BLD: 3 % (ref 0.5–7.8)
ERYTHROCYTE [DISTWIDTH] IN BLOOD BY AUTOMATED COUNT: 15.2 % (ref 11.9–14.6)
GLOBULIN SER CALC-MCNC: 2.8 G/DL (ref 2.3–3.5)
GLUCOSE SERPL-MCNC: 99 MG/DL (ref 70–99)
HCT VFR BLD AUTO: 44.6 % (ref 41.1–50.3)
HDLC SERPL-MCNC: 49 MG/DL (ref 40–60)
HDLC SERPL: 2.5 (ref 0–5)
HEMOCCULT STL QL: NORMAL
HGB BLD-MCNC: 14.8 G/DL (ref 13.6–17.2)
IMM GRANULOCYTES # BLD AUTO: 0 K/UL (ref 0–0.5)
IMM GRANULOCYTES NFR BLD AUTO: 0 % (ref 0–5)
LDLC SERPL CALC-MCNC: 56 MG/DL (ref 0–100)
LYMPHOCYTES # BLD: 2.2 K/UL (ref 0.5–4.6)
LYMPHOCYTES NFR BLD: 23 % (ref 13–44)
MCH RBC QN AUTO: 30.4 PG (ref 26.1–32.9)
MCHC RBC AUTO-ENTMCNC: 33.2 G/DL (ref 31.4–35)
MCV RBC AUTO: 91.6 FL (ref 82–102)
MONOCYTES # BLD: 0.8 K/UL (ref 0.1–1.3)
MONOCYTES NFR BLD: 8 % (ref 4–12)
NEUTS SEG # BLD: 6 K/UL (ref 1.7–8.2)
NEUTS SEG NFR BLD: 65 % (ref 43–78)
NRBC # BLD: 0 K/UL (ref 0–0.2)
PLATELET # BLD AUTO: 199 K/UL (ref 150–450)
PMV BLD AUTO: 11 FL (ref 9.4–12.3)
POTASSIUM SERPL-SCNC: 4.3 MMOL/L (ref 3.5–5.1)
PROT SERPL-MCNC: 6.3 G/DL (ref 6.3–8.2)
RBC # BLD AUTO: 4.87 M/UL (ref 4.23–5.6)
SODIUM SERPL-SCNC: 140 MMOL/L (ref 136–145)
TRIGL SERPL-MCNC: 79 MG/DL (ref 0–150)
VALID INTERNAL CONTROL: YES
VLDLC SERPL CALC-MCNC: 16 MG/DL (ref 6–23)
WBC # BLD AUTO: 9.4 K/UL (ref 4.3–11.1)

## 2024-07-24 ASSESSMENT — ENCOUNTER SYMPTOMS: SHORTNESS OF BREATH: 0

## 2025-01-17 ENCOUNTER — OFFICE VISIT (OUTPATIENT)
Dept: FAMILY MEDICINE CLINIC | Facility: CLINIC | Age: 65
End: 2025-01-17

## 2025-01-17 VITALS
WEIGHT: 164 LBS | HEART RATE: 82 BPM | OXYGEN SATURATION: 98 % | DIASTOLIC BLOOD PRESSURE: 76 MMHG | TEMPERATURE: 98 F | BODY MASS INDEX: 23.53 KG/M2 | SYSTOLIC BLOOD PRESSURE: 122 MMHG

## 2025-01-17 DIAGNOSIS — E78.2 MIXED HYPERLIPIDEMIA: ICD-10-CM

## 2025-01-17 DIAGNOSIS — I10 ESSENTIAL HYPERTENSION, BENIGN: ICD-10-CM

## 2025-01-17 DIAGNOSIS — I73.9 PERIPHERAL VASCULAR DISEASE, UNSPECIFIED (HCC): ICD-10-CM

## 2025-01-17 DIAGNOSIS — Z12.5 PROSTATE CANCER SCREENING: ICD-10-CM

## 2025-01-17 DIAGNOSIS — Z72.0 TOBACCO ABUSE: ICD-10-CM

## 2025-01-17 DIAGNOSIS — I10 ESSENTIAL HYPERTENSION, BENIGN: Primary | ICD-10-CM

## 2025-01-17 DIAGNOSIS — G47.00 INSOMNIA, UNSPECIFIED TYPE: ICD-10-CM

## 2025-01-17 LAB
ALBUMIN SERPL-MCNC: 3.5 G/DL (ref 3.2–4.6)
ALBUMIN/GLOB SERPL: 1.2 (ref 1–1.9)
ALP SERPL-CCNC: 92 U/L (ref 40–129)
ALT SERPL-CCNC: 21 U/L (ref 8–55)
ANION GAP SERPL CALC-SCNC: 10 MMOL/L (ref 7–16)
AST SERPL-CCNC: 25 U/L (ref 15–37)
BASOPHILS # BLD: 0.11 K/UL (ref 0–0.2)
BASOPHILS NFR BLD: 1.3 % (ref 0–2)
BILIRUB SERPL-MCNC: 0.3 MG/DL (ref 0–1.2)
BUN SERPL-MCNC: 13 MG/DL (ref 8–23)
CALCIUM SERPL-MCNC: 9.1 MG/DL (ref 8.8–10.2)
CHLORIDE SERPL-SCNC: 104 MMOL/L (ref 98–107)
CHOLEST SERPL-MCNC: 131 MG/DL (ref 0–200)
CO2 SERPL-SCNC: 25 MMOL/L (ref 20–29)
CREAT SERPL-MCNC: 1.12 MG/DL (ref 0.8–1.3)
DIFFERENTIAL METHOD BLD: ABNORMAL
EOSINOPHIL # BLD: 0.23 K/UL (ref 0–0.8)
EOSINOPHIL NFR BLD: 2.8 % (ref 0.5–7.8)
ERYTHROCYTE [DISTWIDTH] IN BLOOD BY AUTOMATED COUNT: 14.7 % (ref 11.9–14.6)
GLOBULIN SER CALC-MCNC: 2.9 G/DL (ref 2.3–3.5)
GLUCOSE SERPL-MCNC: 77 MG/DL (ref 70–99)
HCT VFR BLD AUTO: 44.6 % (ref 41.1–50.3)
HDLC SERPL-MCNC: 44 MG/DL (ref 40–60)
HDLC SERPL: 3 (ref 0–5)
HGB BLD-MCNC: 15 G/DL (ref 13.6–17.2)
IMM GRANULOCYTES # BLD AUTO: 0.03 K/UL (ref 0–0.5)
IMM GRANULOCYTES NFR BLD AUTO: 0.4 % (ref 0–5)
LDLC SERPL CALC-MCNC: 72 MG/DL (ref 0–100)
LYMPHOCYTES # BLD: 1.89 K/UL (ref 0.5–4.6)
LYMPHOCYTES NFR BLD: 22.8 % (ref 13–44)
MCH RBC QN AUTO: 30.9 PG (ref 26.1–32.9)
MCHC RBC AUTO-ENTMCNC: 33.6 G/DL (ref 31.4–35)
MCV RBC AUTO: 91.8 FL (ref 82–102)
MONOCYTES # BLD: 0.75 K/UL (ref 0.1–1.3)
MONOCYTES NFR BLD: 9 % (ref 4–12)
NEUTS SEG # BLD: 5.29 K/UL (ref 1.7–8.2)
NEUTS SEG NFR BLD: 63.7 % (ref 43–78)
NRBC # BLD: 0 K/UL (ref 0–0.2)
PLATELET # BLD AUTO: 183 K/UL (ref 150–450)
PMV BLD AUTO: 11.1 FL (ref 9.4–12.3)
POTASSIUM SERPL-SCNC: 4.3 MMOL/L (ref 3.5–5.1)
PROT SERPL-MCNC: 6.5 G/DL (ref 6.3–8.2)
PSA SERPL-MCNC: 0.9 NG/ML (ref 0–4)
RBC # BLD AUTO: 4.86 M/UL (ref 4.23–5.6)
SODIUM SERPL-SCNC: 139 MMOL/L (ref 136–145)
TRIGL SERPL-MCNC: 80 MG/DL (ref 0–150)
VLDLC SERPL CALC-MCNC: 16 MG/DL (ref 6–23)
WBC # BLD AUTO: 8.3 K/UL (ref 4.3–11.1)

## 2025-01-17 RX ORDER — PANTOPRAZOLE SODIUM 40 MG/1
40 TABLET, DELAYED RELEASE ORAL DAILY
Qty: 90 TABLET | Refills: 3 | Status: SHIPPED | OUTPATIENT
Start: 2025-01-17

## 2025-01-17 RX ORDER — ATORVASTATIN CALCIUM 80 MG/1
80 TABLET, FILM COATED ORAL DAILY
Qty: 90 TABLET | Refills: 3 | Status: SHIPPED | OUTPATIENT
Start: 2025-01-17

## 2025-01-17 RX ORDER — GABAPENTIN 100 MG/1
200 CAPSULE ORAL 3 TIMES DAILY
Qty: 180 CAPSULE | Refills: 11 | Status: SHIPPED | OUTPATIENT
Start: 2025-01-17 | End: 2026-01-12

## 2025-01-17 RX ORDER — LISINOPRIL 2.5 MG/1
2.5 TABLET ORAL DAILY
Qty: 90 TABLET | Refills: 3 | Status: SHIPPED | OUTPATIENT
Start: 2025-01-17

## 2025-01-17 RX ORDER — TRAZODONE HYDROCHLORIDE 100 MG/1
100 TABLET ORAL NIGHTLY
Qty: 90 TABLET | Refills: 3 | Status: SHIPPED | OUTPATIENT
Start: 2025-01-17

## 2025-01-17 RX ORDER — NYSTATIN 100000 U/G
CREAM TOPICAL
Qty: 30 G | Refills: 5 | Status: SHIPPED | OUTPATIENT
Start: 2025-01-17

## 2025-01-17 SDOH — ECONOMIC STABILITY: FOOD INSECURITY: WITHIN THE PAST 12 MONTHS, YOU WORRIED THAT YOUR FOOD WOULD RUN OUT BEFORE YOU GOT MONEY TO BUY MORE.: NEVER TRUE

## 2025-01-17 SDOH — ECONOMIC STABILITY: FOOD INSECURITY: WITHIN THE PAST 12 MONTHS, THE FOOD YOU BOUGHT JUST DIDN'T LAST AND YOU DIDN'T HAVE MONEY TO GET MORE.: NEVER TRUE

## 2025-01-17 ASSESSMENT — PATIENT HEALTH QUESTIONNAIRE - PHQ9
SUM OF ALL RESPONSES TO PHQ QUESTIONS 1-9: 0
2. FEELING DOWN, DEPRESSED OR HOPELESS: NOT AT ALL
SUM OF ALL RESPONSES TO PHQ QUESTIONS 1-9: 0
SUM OF ALL RESPONSES TO PHQ9 QUESTIONS 1 & 2: 0
1. LITTLE INTEREST OR PLEASURE IN DOING THINGS: NOT AT ALL

## 2025-01-17 NOTE — PROGRESS NOTES
[facility]  [address] [place] [address]  Phone [phone]  Fax:  [phone]    Rakesh Yanez (:  1960) is a 64 y.o. male here for evaluation of the following chief complaint(s):  Insomnia, Gastroesophageal Reflux, Hypertension, and Cholesterol Problem (No labs/refills)    Assessment & Plan   ASSESSMENT/PLAN:  1. Essential hypertension, benign  -     Comprehensive Metabolic Panel; Future  -     CBC with Auto Differential; Future  2. Mixed hyperlipidemia  -     Lipid Panel; Future  3. Tobacco abuse  4. Peripheral vascular disease, unspecified (HCC)  5. Insomnia, unspecified type  6. Prostate cancer screening  -     PSA Screening; Future    Blood pressure controlled, continue low-dose lisinopril. Continue Lipitor for HLD/CAD/PAD.  Check CMP, CBC, and lipid panel.  Continue trazodone for insomnia.     Patient has systolic murmur.  Echo previously ordered but patient never got this done.  Declines echo at this time.     Positive FIT 2024, advised patient to get colonoscopy at that time but he refused.  He wanted to repeat FIT to see if still positive, FIT 2024 came back negative.     Followed by vascular surgery for PAD, history of right AKA.  On aspirin 81 and high-dose statin.  Continues to smoke about 1 pack/day.  Have discussed the need for patient to quit smoking especially given his medical history but he is not ready to quit.  Has tried Wellbutrin and patches without success.  Declines LDCT lung cancer screening.  Have advised patient to get Prevnar 20 vaccine.  Normal screening PSA 2024.    Return in about 6 months (around 2025) for routine f/u.         Subjective   SUBJECTIVE/OBJECTIVE:  HPI  64-year-old male with PMH of CAD, PVD, COPD, HTN, HLD, history of DVT/PE, fatty liver, and gastric ulcer who presents for regular 6-month follow-up of chronic medical problems.   -Doing well, no concerns  -Was started on Gabapentin for his leg pain a few years ago by his

## 2025-07-24 ENCOUNTER — OFFICE VISIT (OUTPATIENT)
Dept: FAMILY MEDICINE CLINIC | Facility: CLINIC | Age: 65
End: 2025-07-24
Payer: MEDICARE

## 2025-07-24 VITALS
OXYGEN SATURATION: 98 % | TEMPERATURE: 98 F | HEIGHT: 70 IN | SYSTOLIC BLOOD PRESSURE: 128 MMHG | DIASTOLIC BLOOD PRESSURE: 68 MMHG | HEART RATE: 78 BPM | BODY MASS INDEX: 23.05 KG/M2 | WEIGHT: 161 LBS

## 2025-07-24 DIAGNOSIS — Z72.0 TOBACCO ABUSE: ICD-10-CM

## 2025-07-24 DIAGNOSIS — I10 ESSENTIAL HYPERTENSION, BENIGN: ICD-10-CM

## 2025-07-24 DIAGNOSIS — G47.00 INSOMNIA, UNSPECIFIED TYPE: ICD-10-CM

## 2025-07-24 DIAGNOSIS — Z00.00 INITIAL MEDICARE ANNUAL WELLNESS VISIT: Primary | ICD-10-CM

## 2025-07-24 DIAGNOSIS — E78.2 MIXED HYPERLIPIDEMIA: ICD-10-CM

## 2025-07-24 DIAGNOSIS — I73.9 PERIPHERAL VASCULAR DISEASE, UNSPECIFIED: ICD-10-CM

## 2025-07-24 DIAGNOSIS — Z12.11 COLON CANCER SCREENING: ICD-10-CM

## 2025-07-24 LAB
ALBUMIN SERPL-MCNC: 3.4 G/DL (ref 3.2–4.6)
ALBUMIN/GLOB SERPL: 1.1 (ref 1–1.9)
ALP SERPL-CCNC: 97 U/L (ref 40–129)
ALT SERPL-CCNC: 23 U/L (ref 8–55)
ANION GAP SERPL CALC-SCNC: 10 MMOL/L (ref 7–16)
AST SERPL-CCNC: 22 U/L (ref 15–37)
BASOPHILS # BLD: 0.09 K/UL (ref 0–0.2)
BASOPHILS NFR BLD: 1 % (ref 0–2)
BILIRUB SERPL-MCNC: 0.4 MG/DL (ref 0–1.2)
BUN SERPL-MCNC: 10 MG/DL (ref 8–23)
CALCIUM SERPL-MCNC: 9.4 MG/DL (ref 8.8–10.2)
CHLORIDE SERPL-SCNC: 102 MMOL/L (ref 98–107)
CHOLEST SERPL-MCNC: 121 MG/DL (ref 0–200)
CO2 SERPL-SCNC: 26 MMOL/L (ref 20–29)
CREAT SERPL-MCNC: 1.14 MG/DL (ref 0.8–1.3)
DIFFERENTIAL METHOD BLD: ABNORMAL
EOSINOPHIL # BLD: 0.18 K/UL (ref 0–0.8)
EOSINOPHIL NFR BLD: 2 % (ref 0.5–7.8)
ERYTHROCYTE [DISTWIDTH] IN BLOOD BY AUTOMATED COUNT: 15.2 % (ref 11.9–14.6)
GLOBULIN SER CALC-MCNC: 3.1 G/DL (ref 2.3–3.5)
GLUCOSE SERPL-MCNC: 90 MG/DL (ref 70–99)
HCT VFR BLD AUTO: 46.4 % (ref 41.1–50.3)
HDLC SERPL-MCNC: 49 MG/DL (ref 40–60)
HDLC SERPL: 2.5 (ref 0–5)
HGB BLD-MCNC: 15.2 G/DL (ref 13.6–17.2)
IMM GRANULOCYTES # BLD AUTO: 0.03 K/UL (ref 0–0.5)
IMM GRANULOCYTES NFR BLD AUTO: 0.3 % (ref 0–5)
LDLC SERPL CALC-MCNC: 60 MG/DL (ref 0–100)
LYMPHOCYTES # BLD: 1.97 K/UL (ref 0.5–4.6)
LYMPHOCYTES NFR BLD: 22.2 % (ref 13–44)
MCH RBC QN AUTO: 30.2 PG (ref 26.1–32.9)
MCHC RBC AUTO-ENTMCNC: 32.8 G/DL (ref 31.4–35)
MCV RBC AUTO: 92.2 FL (ref 82–102)
MONOCYTES # BLD: 0.78 K/UL (ref 0.1–1.3)
MONOCYTES NFR BLD: 8.8 % (ref 4–12)
NEUTS SEG # BLD: 5.83 K/UL (ref 1.7–8.2)
NEUTS SEG NFR BLD: 65.7 % (ref 43–78)
NRBC # BLD: 0 K/UL (ref 0–0.2)
PLATELET # BLD AUTO: 161 K/UL (ref 150–450)
PMV BLD AUTO: 11.1 FL (ref 9.4–12.3)
POTASSIUM SERPL-SCNC: 4.7 MMOL/L (ref 3.5–5.1)
PROT SERPL-MCNC: 6.5 G/DL (ref 6.3–8.2)
RBC # BLD AUTO: 5.03 M/UL (ref 4.23–5.6)
SODIUM SERPL-SCNC: 138 MMOL/L (ref 136–145)
TRIGL SERPL-MCNC: 61 MG/DL (ref 0–150)
VLDLC SERPL CALC-MCNC: 12 MG/DL (ref 6–23)
WBC # BLD AUTO: 8.9 K/UL (ref 4.3–11.1)

## 2025-07-24 PROCEDURE — 3074F SYST BP LT 130 MM HG: CPT | Performed by: STUDENT IN AN ORGANIZED HEALTH CARE EDUCATION/TRAINING PROGRAM

## 2025-07-24 PROCEDURE — 3078F DIAST BP <80 MM HG: CPT | Performed by: STUDENT IN AN ORGANIZED HEALTH CARE EDUCATION/TRAINING PROGRAM

## 2025-07-24 PROCEDURE — G0438 PPPS, INITIAL VISIT: HCPCS | Performed by: STUDENT IN AN ORGANIZED HEALTH CARE EDUCATION/TRAINING PROGRAM

## 2025-07-24 PROCEDURE — G2211 COMPLEX E/M VISIT ADD ON: HCPCS | Performed by: STUDENT IN AN ORGANIZED HEALTH CARE EDUCATION/TRAINING PROGRAM

## 2025-07-24 PROCEDURE — 99214 OFFICE O/P EST MOD 30 MIN: CPT | Performed by: STUDENT IN AN ORGANIZED HEALTH CARE EDUCATION/TRAINING PROGRAM

## 2025-07-24 RX ORDER — ATORVASTATIN CALCIUM 80 MG/1
80 TABLET, FILM COATED ORAL DAILY
Qty: 90 TABLET | Refills: 3 | Status: SHIPPED | OUTPATIENT
Start: 2025-07-24

## 2025-07-24 RX ORDER — NYSTATIN 100000 U/G
CREAM TOPICAL
Qty: 30 G | Refills: 5 | Status: SHIPPED | OUTPATIENT
Start: 2025-07-24

## 2025-07-24 RX ORDER — PANTOPRAZOLE SODIUM 40 MG/1
40 TABLET, DELAYED RELEASE ORAL DAILY
Qty: 90 TABLET | Refills: 3 | Status: SHIPPED | OUTPATIENT
Start: 2025-07-24

## 2025-07-24 RX ORDER — GABAPENTIN 100 MG/1
200 CAPSULE ORAL 3 TIMES DAILY
Qty: 180 CAPSULE | Refills: 11 | Status: SHIPPED | OUTPATIENT
Start: 2025-07-24 | End: 2026-07-19

## 2025-07-24 RX ORDER — LISINOPRIL 2.5 MG/1
2.5 TABLET ORAL DAILY
Qty: 90 TABLET | Refills: 3 | Status: SHIPPED | OUTPATIENT
Start: 2025-07-24

## 2025-07-24 ASSESSMENT — LIFESTYLE VARIABLES
HOW MANY STANDARD DRINKS CONTAINING ALCOHOL DO YOU HAVE ON A TYPICAL DAY: 1 OR 2
HOW OFTEN DO YOU HAVE A DRINK CONTAINING ALCOHOL: MONTHLY OR LESS

## 2025-07-24 ASSESSMENT — PATIENT HEALTH QUESTIONNAIRE - PHQ9
SUM OF ALL RESPONSES TO PHQ QUESTIONS 1-9: 0
1. LITTLE INTEREST OR PLEASURE IN DOING THINGS: NOT AT ALL
SUM OF ALL RESPONSES TO PHQ QUESTIONS 1-9: 0
2. FEELING DOWN, DEPRESSED OR HOPELESS: NOT AT ALL
SUM OF ALL RESPONSES TO PHQ QUESTIONS 1-9: 0
SUM OF ALL RESPONSES TO PHQ QUESTIONS 1-9: 0

## (undated) DEVICE — CANNULA NSL ORAL AD FOR CAPNOFLEX CO2 O2 AIRLFE

## (undated) DEVICE — CONNECTOR TBNG OD5-7MM O2 END DISP

## (undated) DEVICE — FORCEPS BX L240CM JAW DIA2.8MM L CAP W/ NDL MIC MESH TOOTH

## (undated) DEVICE — CONTAINER PREFIL FRMLN 40ML --

## (undated) DEVICE — KENDALL RADIOLUCENT FOAM MONITORING ELECTRODE RECTANGULAR SHAPE: Brand: KENDALL

## (undated) DEVICE — BLOCK BITE AD 60FR W/ VELC STRP ADDRESSES MOST PT AND